# Patient Record
Sex: FEMALE | Race: WHITE | NOT HISPANIC OR LATINO | Employment: OTHER | URBAN - METROPOLITAN AREA
[De-identification: names, ages, dates, MRNs, and addresses within clinical notes are randomized per-mention and may not be internally consistent; named-entity substitution may affect disease eponyms.]

---

## 2018-01-22 ENCOUNTER — PREP FOR SURGERY (OUTPATIENT)
Dept: OTHER | Facility: HOSPITAL | Age: 68
End: 2018-01-22

## 2018-01-22 DIAGNOSIS — Z80.0 FH: COLON CANCER: ICD-10-CM

## 2018-01-22 DIAGNOSIS — D12.6 ADENOMATOUS POLYP OF COLON, UNSPECIFIED PART OF COLON: Primary | ICD-10-CM

## 2018-01-23 PROBLEM — Z80.0 FH: COLON CANCER: Status: ACTIVE | Noted: 2018-01-23

## 2018-01-23 PROBLEM — D12.6 ADENOMATOUS POLYP OF COLON: Status: ACTIVE | Noted: 2018-01-23

## 2018-03-20 ENCOUNTER — HOSPITAL ENCOUNTER (OUTPATIENT)
Facility: HOSPITAL | Age: 68
Setting detail: HOSPITAL OUTPATIENT SURGERY
Discharge: HOME | End: 2018-03-20
Attending: INTERNAL MEDICINE | Admitting: INTERNAL MEDICINE

## 2018-03-20 ENCOUNTER — ANESTHESIA (OUTPATIENT)
Dept: GASTROENTEROLOGY | Facility: HOSPITAL | Age: 68
End: 2018-03-20

## 2018-03-20 ENCOUNTER — ANESTHESIA EVENT (OUTPATIENT)
Dept: GASTROENTEROLOGY | Facility: HOSPITAL | Age: 68
End: 2018-03-20

## 2018-03-20 VITALS
DIASTOLIC BLOOD PRESSURE: 68 MMHG | BODY MASS INDEX: 23.94 KG/M2 | SYSTOLIC BLOOD PRESSURE: 109 MMHG | TEMPERATURE: 97.6 F | HEART RATE: 71 BPM | HEIGHT: 62 IN | WEIGHT: 130.1 LBS | OXYGEN SATURATION: 100 % | RESPIRATION RATE: 16 BRPM

## 2018-03-20 DIAGNOSIS — Z80.0 FH: COLON CANCER: ICD-10-CM

## 2018-03-20 DIAGNOSIS — D12.6 ADENOMATOUS POLYP OF COLON, UNSPECIFIED PART OF COLON: ICD-10-CM

## 2018-03-20 PROCEDURE — 45385 COLONOSCOPY W/LESION REMOVAL: CPT | Performed by: INTERNAL MEDICINE

## 2018-03-20 PROCEDURE — 88305 TISSUE EXAM BY PATHOLOGIST: CPT | Performed by: INTERNAL MEDICINE

## 2018-03-20 PROCEDURE — 45381 COLONOSCOPY SUBMUCOUS NJX: CPT | Performed by: INTERNAL MEDICINE

## 2018-03-20 PROCEDURE — 25010000002 PROPOFOL 10 MG/ML EMULSION: Performed by: ANESTHESIOLOGY

## 2018-03-20 PROCEDURE — S0260 H&P FOR SURGERY: HCPCS | Performed by: INTERNAL MEDICINE

## 2018-03-20 RX ORDER — SODIUM CHLORIDE, SODIUM LACTATE, POTASSIUM CHLORIDE, CALCIUM CHLORIDE 600; 310; 30; 20 MG/100ML; MG/100ML; MG/100ML; MG/100ML
30 INJECTION, SOLUTION INTRAVENOUS CONTINUOUS PRN
Status: DISCONTINUED | OUTPATIENT
Start: 2018-03-20 | End: 2018-03-20 | Stop reason: HOSPADM

## 2018-03-20 RX ORDER — VITAMIN E 268 MG
400 CAPSULE ORAL DAILY
COMMUNITY
End: 2021-10-07

## 2018-03-20 RX ORDER — FLUTICASONE PROPIONATE 50 MCG
2 SPRAY, SUSPENSION (ML) NASAL DAILY
COMMUNITY

## 2018-03-20 RX ORDER — PROPOFOL 10 MG/ML
VIAL (ML) INTRAVENOUS CONTINUOUS PRN
Status: DISCONTINUED | OUTPATIENT
Start: 2018-03-20 | End: 2018-03-20 | Stop reason: SURG

## 2018-03-20 RX ORDER — ASPIRIN 81 MG/1
81 TABLET, CHEWABLE ORAL DAILY
COMMUNITY
End: 2021-10-19 | Stop reason: HOSPADM

## 2018-03-20 RX ORDER — PROPOFOL 10 MG/ML
VIAL (ML) INTRAVENOUS AS NEEDED
Status: DISCONTINUED | OUTPATIENT
Start: 2018-03-20 | End: 2018-03-20 | Stop reason: SURG

## 2018-03-20 RX ORDER — SODIUM CHLORIDE 0.9 % (FLUSH) 0.9 %
1-10 SYRINGE (ML) INJECTION AS NEEDED
Status: DISCONTINUED | OUTPATIENT
Start: 2018-03-20 | End: 2018-03-20 | Stop reason: HOSPADM

## 2018-03-20 RX ORDER — LIDOCAINE HYDROCHLORIDE 20 MG/ML
INJECTION, SOLUTION INFILTRATION; PERINEURAL AS NEEDED
Status: DISCONTINUED | OUTPATIENT
Start: 2018-03-20 | End: 2018-03-20 | Stop reason: SURG

## 2018-03-20 RX ORDER — FEXOFENADINE HCL 180 MG/1
180 TABLET ORAL DAILY
COMMUNITY

## 2018-03-20 RX ADMIN — LIDOCAINE HYDROCHLORIDE 60 MG: 20 INJECTION, SOLUTION INFILTRATION; PERINEURAL at 08:44

## 2018-03-20 RX ADMIN — PROPOFOL 80 MG: 10 INJECTION, EMULSION INTRAVENOUS at 08:44

## 2018-03-20 RX ADMIN — PROPOFOL 20 MG: 10 INJECTION, EMULSION INTRAVENOUS at 08:45

## 2018-03-20 RX ADMIN — PROPOFOL 140 MCG/KG/MIN: 10 INJECTION, EMULSION INTRAVENOUS at 08:44

## 2018-03-20 RX ADMIN — SODIUM CHLORIDE, POTASSIUM CHLORIDE, SODIUM LACTATE AND CALCIUM CHLORIDE: 600; 310; 30; 20 INJECTION, SOLUTION INTRAVENOUS at 08:46

## 2018-03-20 RX ADMIN — PROPOFOL 20 MG: 10 INJECTION, EMULSION INTRAVENOUS at 08:53

## 2018-03-20 NOTE — ANESTHESIA PREPROCEDURE EVALUATION
Anesthesia Evaluation     Patient summary reviewed and Nursing notes reviewed                Airway   Mallampati: II  TM distance: >3 FB  Neck ROM: full  No difficulty expected  Dental      Pulmonary    (+) asthma,   Cardiovascular     (+) valvular problems/murmurs MVP, dysrhythmias Atrial Fib,       Neuro/Psych  GI/Hepatic/Renal/Endo      Musculoskeletal     Abdominal    Substance History      OB/GYN          Other                        Anesthesia Plan    ASA 2     MAC   total IV anesthesia  intravenous induction   Anesthetic plan and risks discussed with patient.

## 2018-03-20 NOTE — ANESTHESIA POSTPROCEDURE EVALUATION
Patient: Reina Zaman    Procedure Summary     Date:  03/20/18 Room / Location:   [facility]    Anesthesia Start:  0836 Anesthesia Stop:  0911    Procedure:  COLONOSCOPY INTO CECUM AND TI WITH HOT SNARE POLYPECTOMIES, SALINE LIFT (N/A ) Diagnosis:       FH: colon cancer      Adenomatous polyp of colon, unspecified part of colon      (FH: colon cancer [Z80.0])      (Adenomatous polyp of colon, unspecified part of colon [D12.6])    Surgeon:  Lidia Laura MD Provider:  Rowan eVnegas MD    Anesthesia Type:  MAC ASA Status:  2          Anesthesia Type: MAC  Last vitals  BP   110/66 (03/20/18 0911)   Temp   36.4 °C (97.6 °F) (03/20/18 0711)   Pulse   79 (03/20/18 0911)   Resp   16 (03/20/18 0911)     SpO2   96 % (03/20/18 0911)     Post Anesthesia Care and Evaluation    Patient location during evaluation: bedside  Patient participation: complete - patient cannot participate  Level of consciousness: awake and alert  Pain management: adequate  Airway patency: patent  Anesthetic complications: No anesthetic complications  PONV Status: controlled  Cardiovascular status: acceptable  Respiratory status: acceptable  Hydration status: acceptable

## 2018-03-20 NOTE — DISCHARGE INSTRUCTIONS
For the next 24 hours patient needs to be with a responsible adult.    For 24 hours DO NOT drive, operate machinery, appliances, drink alcohol, make important decisions or sign legal documents.    Start with a light or bland diet and advance to regular diet as tolerated.    Follow recommendations on procedure report provided by your doctor.    Call Dr Laura for problems [phone]    Problems may include but not limited to: large amounts of bleeding, trouble breathing, repeated vomiting, severe unrelieved pain, fever or chills.

## 2018-03-20 NOTE — H&P
[facility]  Pre Procedure History & Physical    Chief Complaint:   History of adenomatous polyps, FH CRC in multiple second degree relatives (2 1st cousins, MGM)    Subjective     HPI:   67 yo WF for surveillance c/s.  Last c/s 3 years ago with tubular adenome removed by another physician.  FH CRC.  NO active gi issues    Past Medical History:   Past Medical History:   Diagnosis Date   • Asthma    • Atrial fibrillation    • Colon polyp    • Endometriosis    • MVP (mitral valve prolapse)    • Osteoporosis        Past Surgical History:  Past Surgical History:   Procedure Laterality Date   • ABDOMINAL SURGERY     • APPENDECTOMY      removed with colon surgery   • COLON SURGERY  1983    bowel blockage   • COLONOSCOPY  01/2015   • FULGURATION ENDOMETRIOSIS     • HYSTERECTOMY         Family History:  Family History   Problem Relation Age of Onset   • Cancer Maternal Grandmother      colon   • Cancer Other      two cousins colon cancer       Social History:   reports that she has quit smoking. She has never used smokeless tobacco. She reports that she drinks about 4.2 oz of alcohol per week . She reports that she does not use drugs.    Medications:   Prescriptions Prior to Admission   Medication Sig Dispense Refill Last Dose   • aspirin 81 MG chewable tablet Chew 81 mg Daily.   3/13/2018   • B Complex-Biotin-FA (SUPER B-COMPLEX PO) Take 1 dose by mouth Daily.   Past Week at Unknown time   • Bioflavonoid Products (GRETTA C PO) Take 2,000 mg by mouth Daily.   Past Week at Unknown time   • Calcium Carbonate-Vit D-Min (CALCIUM 1200 PO) Take 2 tablets by mouth Daily.   Past Week at Unknown time   • Coenzyme Q10 (COQ10) 400 MG capsule Take 400 mg by mouth Daily.   Past Week at Unknown time   • fexofenadine (ALLEGRA) 180 MG tablet Take 180 mg by mouth Daily.   3/19/2018 at Unknown time   • Flaxseed, Linseed, (FLAXSEED OIL) 1200 MG capsule Take 2,400 mg by mouth Daily.   Past Week at Unknown time   •  "fluticasone (FLONASE) 50 MCG/ACT nasal spray 2 sprays into each nostril Daily.   3/19/2018 at Unknown time   • Misc Natural Products (OSTEO BI-FLEX ADV TRIPLE ST PO) Take 1 dose by mouth Daily.   Past Week at Unknown time   • Multiple Vitamins-Minerals (CENTRUM SILVER 50+WOMEN PO) Take 1 dose by mouth Daily.   Past Week at Unknown time   • Probiotic Product ([employer] PO) Take 1 tablet by mouth Daily.   Past Week at Unknown time   • St Johns Wort 450 MG capsule Take 2 capsules by mouth Daily.   Past Week at Unknown time   • TURMERIC PO Take 2,000 mg by mouth Daily.   Past Week at Unknown time   • vitamin E 400 UNIT capsule Take 400 Units by mouth Daily.   Past Week at Unknown time   • baclofen (LIORESAL) 20 MG tablet    More than a month at Unknown time   • Estradiol (DIVIGEL TD) Place  on the skin.   More than a month at Unknown time   • meloxicam (MOBIC) 15 MG tablet    More than a month at Unknown time   • metoprolol succinate XL (TOPROL-XL) 25 MG 24 hr tablet 12.5 mg.   2/27/2018       Allergies:  Review of patient's allergies indicates no known allergies.    ROS:    Pertinent items are noted in HPI, all other systems reviewed and negative     Objective     Blood pressure 128/78, pulse 71, temperature 97.6 °F (36.4 °C), temperature source Oral, resp. rate 14, height 157.5 cm (62\"), weight 59 kg (130 lb 1.6 oz), SpO2 98 %.    Physical Exam   Constitutional: Pt is oriented to person, place, and time and well-developed, well-nourished, and in no distress.   Mouth/Throat: Oropharynx is clear and moist.   Neck: Normal range of motion.   Cardiovascular: Normal rate, regular rhythm   Pulmonary/Chest: Effort normal    Abdominal: Soft. Nontender  Skin: Skin is warm and dry.   Psychiatric: Mood, memory, affect and judgment normal.     Assessment/Plan     Diagnosis:  History of adenomatous polyps, FH CRC in multiple second degree relatives (2 1st cousins, MGM)    Anticipated Surgical " Procedure:  colonoscopy    The risks, benefits, and alternatives of this procedure have been discussed with the patient or the responsible party- the patient understands and agrees to proceed.

## 2018-03-21 LAB
CYTO UR: NORMAL
LAB AP CASE REPORT: NORMAL
Lab: NORMAL
PATH REPORT.FINAL DX SPEC: NORMAL
PATH REPORT.GROSS SPEC: NORMAL

## 2018-03-26 ENCOUNTER — TELEPHONE (OUTPATIENT)
Dept: GASTROENTEROLOGY | Facility: CLINIC | Age: 68
End: 2018-03-26

## 2018-03-27 NOTE — TELEPHONE ENCOUNTER
Call to pt.  Advise per DR Laura that the polyp(s) bx showed adenomatous change.  This is not cancerous, but is considered potentially precancerous.  F/u c/s in 3 yrs is advised.  Pt verb understanding.    C/s for 3/20/21 is in recall.

## 2019-02-22 ENCOUNTER — TRANSCRIBE ORDERS (OUTPATIENT)
Dept: ADMINISTRATIVE | Facility: HOSPITAL | Age: 69
End: 2019-02-22

## 2019-02-22 DIAGNOSIS — M25.562 LEFT KNEE PAIN, UNSPECIFIED CHRONICITY: Primary | ICD-10-CM

## 2019-02-22 DIAGNOSIS — M25.561 RIGHT KNEE PAIN, UNSPECIFIED CHRONICITY: ICD-10-CM

## 2019-03-10 ENCOUNTER — HOSPITAL ENCOUNTER (OUTPATIENT)
Dept: MRI IMAGING | Facility: HOSPITAL | Age: 69
Discharge: HOME | End: 2019-03-10
Admitting: INTERNAL MEDICINE

## 2019-03-10 ENCOUNTER — HOSPITAL ENCOUNTER (OUTPATIENT)
Dept: MRI IMAGING | Facility: HOSPITAL | Age: 69
Discharge: HOME | End: 2019-03-10

## 2019-03-10 DIAGNOSIS — M25.562 LEFT KNEE PAIN, UNSPECIFIED CHRONICITY: ICD-10-CM

## 2019-03-10 DIAGNOSIS — M25.561 RIGHT KNEE PAIN, UNSPECIFIED CHRONICITY: ICD-10-CM

## 2019-03-10 PROCEDURE — 73721 MRI JNT OF LWR EXTRE W/O DYE: CPT

## 2019-04-25 ENCOUNTER — OFFICE VISIT (OUTPATIENT)
Dept: ORTHOPEDIC SURGERY | Facility: CLINIC | Age: 69
End: 2019-04-25

## 2019-04-25 VITALS — BODY MASS INDEX: 23.85 KG/M2 | HEIGHT: 62 IN | WEIGHT: 129.6 LBS | TEMPERATURE: 97.8 F

## 2019-04-25 DIAGNOSIS — M25.561 BILATERAL CHRONIC KNEE PAIN: Primary | ICD-10-CM

## 2019-04-25 DIAGNOSIS — M25.562 BILATERAL CHRONIC KNEE PAIN: Primary | ICD-10-CM

## 2019-04-25 DIAGNOSIS — G89.29 BILATERAL CHRONIC KNEE PAIN: Primary | ICD-10-CM

## 2019-04-25 PROCEDURE — 99203 OFFICE O/P NEW LOW 30 MIN: CPT | Performed by: NURSE PRACTITIONER

## 2019-04-25 PROCEDURE — 73562 X-RAY EXAM OF KNEE 3: CPT | Performed by: NURSE PRACTITIONER

## 2019-04-25 RX ORDER — CELECOXIB 200 MG/1
200 CAPSULE ORAL DAILY PRN
Refills: 10 | COMMUNITY
Start: 2019-04-19 | End: 2021-10-07

## 2019-04-25 RX ORDER — METOPROLOL SUCCINATE 25 MG/1
12.5 TABLET, EXTENDED RELEASE ORAL DAILY
COMMUNITY
Start: 2018-12-04 | End: 2019-04-25

## 2019-04-25 RX ORDER — PANTOPRAZOLE SODIUM 40 MG/1
40 TABLET, DELAYED RELEASE ORAL DAILY
Refills: 3 | COMMUNITY
Start: 2019-04-19 | End: 2021-04-19 | Stop reason: SDUPTHER

## 2019-04-25 NOTE — PROGRESS NOTES
Patient: Reina Zaman  YOB: 1950 69 y.o. female  Medical Record Number: [id number]    Chief Complaints:   Chief Complaint   Patient presents with   • Right Knee - Establish Care, Pain   • Left Knee - Establish Care, Pain       History of Present Illness:Reina Zaman is a 69 y.o. female who presents as a new patient to this practice as well as myself with complaints of bilateral knee pain.  Patient reports she has had bilateral knee pain off and on for several years denies any injury.  Describes the knee pain is a moderate constant ache with clicking and swelling, worse with walking better with rest    Allergies: No Known Allergies    Medications:   Current Outpatient Medications   Medication Sig Dispense Refill   • aspirin 81 MG chewable tablet Chew 81 mg Daily.     • B Complex-Biotin-FA (SUPER B-COMPLEX PO) Take 1 dose by mouth Daily.     • Calcium Carbonate-Vit D-Min (CALCIUM 1200 PO) Take 2 tablets by mouth Daily.     • celecoxib (CeleBREX) 200 MG capsule Take 200 mg by mouth Daily As Needed.  10   • Coenzyme Q10 (COQ10) 400 MG capsule Take 400 mg by mouth Daily.     • fexofenadine (ALLEGRA) 180 MG tablet Take 180 mg by mouth Daily.     • Flaxseed, Linseed, (FLAXSEED OIL) 1200 MG capsule Take 2,400 mg by mouth Daily.     • fluticasone (FLONASE) 50 MCG/ACT nasal spray 2 sprays into each nostril Daily.     • metoprolol succinate XL (TOPROL-XL) 25 MG 24 hr tablet 12.5 mg.     • Misc Natural Products (OSTEO BI-FLEX ADV TRIPLE ST PO) Take 1 dose by mouth Daily.     • Multiple Vitamins-Minerals (CENTRUM SILVER 50+WOMEN PO) Take 1 dose by mouth Daily.     • pantoprazole (PROTONIX) 40 MG EC tablet Take 40 mg by mouth Daily.  3   • Probiotic Product ([employer] PO) Take 1 tablet by mouth Daily.     • TURMERIC PO Take 2,000 mg by mouth Daily.     • baclofen (LIORESAL) 20 MG tablet      • Bioflavonoid Products (GRETTA C PO) Take 2,000 mg by mouth Daily.     • Estradiol (DIVIGEL TD) Place  on the  "skin.     • meloxicam (MOBIC) 15 MG tablet      • St Johns Wort 450 MG capsule Take 2 capsules by mouth Daily.     • vitamin E 400 UNIT capsule Take 400 Units by mouth Daily.       No current facility-administered medications for this visit.          The following portions of the patient's history were reviewed and updated as appropriate: allergies, current medications, past family history, past medical history, past social history, past surgical history and problem list.    Review of Systems:   A 14 point review of systems was performed. All systems negative except pertinent positives/negative listed in HPI above    Physical Exam:   Vitals:    04/25/19 1034   Temp: 97.8 °F (36.6 °C)   Weight: 58.8 kg (129 lb 9.6 oz)   Height: 157.5 cm (62\")       General: A and O x 3, ASA, NAD    SCLERA:    Normal    DENTITION:   Normal  Skin clear no unusual lesions noted  Bilateral knees patient has no appreciable effusion 120 degrees flexion neutral and extension with significant patellofemoral crepitus noted on the left.  Positive Jose negative Lockman calf is soft and nontender    Radiology:  Xrays 3views (ap,lateral, sunrise) bilateral knees were ordered and reviewed today secondary to pain and show osteoarthritis bilateral knees patellofemoral on the left more lateral compartment on the right knee.  No compared to these available    Assessment/Plan:  Osteoarthritis bilateral knees    We will proceed with bilateral knee cortisone injection.  Prior to injections risks were discussed including pain, infection, elevated blood sugar.  Patient verbalized understanding would like to proceed with injections.  I did give her information on Synvisc injections and she will return the office in 3 months for those if she is still having pain.  She will continue with physical therapy we will see her back as needed  "

## 2019-07-05 ENCOUNTER — TELEPHONE (OUTPATIENT)
Dept: ORTHOPEDIC SURGERY | Facility: CLINIC | Age: 69
End: 2019-07-05

## 2019-07-05 DIAGNOSIS — M17.0 PRIMARY OSTEOARTHRITIS OF BOTH KNEES: Primary | ICD-10-CM

## 2019-07-26 ENCOUNTER — CLINICAL SUPPORT (OUTPATIENT)
Dept: ORTHOPEDIC SURGERY | Facility: CLINIC | Age: 69
End: 2019-07-26

## 2019-07-26 VITALS — WEIGHT: 128 LBS | HEIGHT: 62 IN | BODY MASS INDEX: 23.55 KG/M2

## 2019-07-26 DIAGNOSIS — M25.562 BILATERAL CHRONIC KNEE PAIN: ICD-10-CM

## 2019-07-26 DIAGNOSIS — G89.29 BILATERAL CHRONIC KNEE PAIN: ICD-10-CM

## 2019-07-26 DIAGNOSIS — M25.561 BILATERAL CHRONIC KNEE PAIN: ICD-10-CM

## 2019-07-26 PROCEDURE — 20610 DRAIN/INJ JOINT/BURSA W/O US: CPT | Performed by: NURSE PRACTITIONER

## 2019-07-26 RX ORDER — METHYLPREDNISOLONE ACETATE 80 MG/ML
80 INJECTION, SUSPENSION INTRA-ARTICULAR; INTRALESIONAL; INTRAMUSCULAR; SOFT TISSUE
Status: COMPLETED | OUTPATIENT
Start: 2019-07-26 | End: 2019-07-26

## 2019-07-26 RX ORDER — ZOSTER VACCINE RECOMBINANT, ADJUVANTED 50 MCG/0.5
KIT INTRAMUSCULAR
COMMUNITY
Start: 2019-07-23 | End: 2020-10-29

## 2019-07-26 RX ORDER — ESTRADIOL 0.1 MG/G
0.5 CREAM VAGINAL 2 TIMES WEEKLY
COMMUNITY
Start: 2019-07-18 | End: 2020-07-17

## 2019-07-26 RX ADMIN — METHYLPREDNISOLONE ACETATE 80 MG: 80 INJECTION, SUSPENSION INTRA-ARTICULAR; INTRALESIONAL; INTRAMUSCULAR; SOFT TISSUE at 07:59

## 2019-07-26 RX ADMIN — METHYLPREDNISOLONE ACETATE 80 MG: 80 INJECTION, SUSPENSION INTRA-ARTICULAR; INTRALESIONAL; INTRAMUSCULAR; SOFT TISSUE at 08:00

## 2019-07-26 NOTE — PROGRESS NOTES
7/26/2019    Reina Zaman is here today for worsening knee pain. Pt has undergone injection of the knee in the past with good resolution of symptoms. Pt is requesting a repeat injection.     KNEE Injection Procedure Note:    Large Joint Arthrocentesis: R knee  Date/Time: 7/26/2019 7:59 AM  Consent given by: patient  Site marked: site marked  Timeout: Immediately prior to procedure a time out was called to verify the correct patient, procedure, equipment, support staff and site/side marked as required   Supporting Documentation  Indications: pain and joint swelling   Procedure Details  Location: knee - R knee  Preparation: Patient was prepped and draped in the usual sterile fashion  Needle size: 22 G (21)  Approach: anterolateral  Medications administered: 80 mg methylPREDNISolone acetate 80 MG/ML; 4 mL lidocaine (cardiac)  Patient tolerance: patient tolerated the procedure well with no immediate complications    Large Joint Arthrocentesis: L knee  Date/Time: 7/26/2019 8:00 AM  Consent given by: patient  Site marked: site marked  Timeout: Immediately prior to procedure a time out was called to verify the correct patient, procedure, equipment, support staff and site/side marked as required   Supporting Documentation  Indications: pain and joint swelling   Procedure Details  Location: knee - L knee  Preparation: Patient was prepped and draped in the usual sterile fashion  Needle size: 22 G (21)  Approach: anterolateral  Medications administered: 4 mL lidocaine (cardiac); 80 mg methylPREDNISolone acetate 80 MG/ML  Patient tolerance: patient tolerated the procedure well with no immediate complications      Prior to injection risks were discussed including pain, infection, elevated blood sugar.  Patient verbalized understanding would like to proceed with injections    At the conclusion of the injection I discussed the importance of continued quad strengthening exercises on a daily basis. I will see the patient back if the  symptoms should fail to improve or worsen.    Veronica Rodriguez APRN  7/26/2019

## 2019-08-05 ENCOUNTER — TELEPHONE (OUTPATIENT)
Dept: ORTHOPEDIC SURGERY | Facility: CLINIC | Age: 69
End: 2019-08-05

## 2019-09-04 ENCOUNTER — TELEPHONE (OUTPATIENT)
Dept: ORTHOPEDIC SURGERY | Facility: CLINIC | Age: 69
End: 2019-09-04

## 2019-09-06 ENCOUNTER — OFFICE VISIT (OUTPATIENT)
Dept: ORTHOPEDIC SURGERY | Facility: CLINIC | Age: 69
End: 2019-09-06

## 2019-09-06 VITALS — WEIGHT: 127 LBS | BODY MASS INDEX: 23.98 KG/M2 | HEIGHT: 61 IN | TEMPERATURE: 97.8 F

## 2019-09-06 DIAGNOSIS — M17.0 PRIMARY OSTEOARTHRITIS OF BOTH KNEES: Primary | ICD-10-CM

## 2019-09-06 PROCEDURE — 20610 DRAIN/INJ JOINT/BURSA W/O US: CPT | Performed by: ORTHOPAEDIC SURGERY

## 2019-09-06 RX ADMIN — METHYLPREDNISOLONE ACETATE 80 MG: 80 INJECTION, SUSPENSION INTRA-ARTICULAR; INTRALESIONAL; INTRAMUSCULAR; SOFT TISSUE at 13:19

## 2019-09-06 RX ADMIN — METHYLPREDNISOLONE ACETATE 80 MG: 80 INJECTION, SUSPENSION INTRA-ARTICULAR; INTRALESIONAL; INTRAMUSCULAR; SOFT TISSUE at 13:20

## 2019-09-06 NOTE — PROGRESS NOTES
9/6/2019    Reina Zaman is here today for worsening knee pain. Pt has undergone injection of the knee in the past with good resolution of symptoms. Pt is requesting a repeat injection.     KNEE Injection Procedure Note:    Large Joint Arthrocentesis: R knee  Date/Time: 9/6/2019 1:19 PM  Consent given by: patient  Site marked: site marked  Timeout: Immediately prior to procedure a time out was called to verify the correct patient, procedure, equipment, support staff and site/side marked as required   Supporting Documentation  Indications: pain   Procedure Details  Location: knee - R knee  Needle size: 25 G  Approach: anteromedial  Medications administered: 2 mL lidocaine (cardiac); 80 mg methylPREDNISolone acetate 80 MG/ML  Aspirate amount: 30 mL      Large Joint Arthrocentesis: L knee  Date/Time: 9/6/2019 1:20 PM  Consent given by: patient  Site marked: site marked  Timeout: Immediately prior to procedure a time out was called to verify the correct patient, procedure, equipment, support staff and site/side marked as required   Supporting Documentation  Indications: pain   Procedure Details  Location: knee - L knee  Needle size: 25 G  Approach: anteromedial  Medications administered: 2 mL lidocaine (cardiac); 80 mg methylPREDNISolone acetate 80 MG/ML  Patient tolerance: patient tolerated the procedure well with no immediate complications          At the conclusion of the injection I discussed the importance of continued quad strengthening exercises on a daily basis. I will see the patient back if the symptoms should fail to improve or worsen.    Rowan Mcneil MA  9/6/2019

## 2019-09-10 RX ORDER — METHYLPREDNISOLONE ACETATE 80 MG/ML
80 INJECTION, SUSPENSION INTRA-ARTICULAR; INTRALESIONAL; INTRAMUSCULAR; SOFT TISSUE
Status: COMPLETED | OUTPATIENT
Start: 2019-09-06 | End: 2019-09-06

## 2020-01-16 ENCOUNTER — OFFICE VISIT (OUTPATIENT)
Dept: ORTHOPEDIC SURGERY | Facility: CLINIC | Age: 70
End: 2020-01-16

## 2020-01-16 VITALS — TEMPERATURE: 98.4 F | WEIGHT: 130 LBS | HEIGHT: 62 IN | BODY MASS INDEX: 23.92 KG/M2

## 2020-01-16 DIAGNOSIS — M17.0 BILATERAL PRIMARY OSTEOARTHRITIS OF KNEE: Primary | ICD-10-CM

## 2020-01-16 PROCEDURE — 20610 DRAIN/INJ JOINT/BURSA W/O US: CPT | Performed by: ORTHOPAEDIC SURGERY

## 2020-01-16 PROCEDURE — 99213 OFFICE O/P EST LOW 20 MIN: CPT | Performed by: ORTHOPAEDIC SURGERY

## 2020-01-16 RX ORDER — DOXYCYCLINE 100 MG/1
CAPSULE ORAL
COMMUNITY
Start: 2019-12-11 | End: 2021-10-07

## 2020-01-16 RX ORDER — ALBUTEROL SULFATE 90 UG/1
AEROSOL, METERED RESPIRATORY (INHALATION)
COMMUNITY
Start: 2019-11-30 | End: 2021-10-07

## 2020-01-16 RX ORDER — AZITHROMYCIN 250 MG/1
TABLET, FILM COATED ORAL
COMMUNITY
Start: 2019-11-30 | End: 2020-10-29

## 2020-01-16 RX ORDER — PREDNISONE 10 MG/1
TABLET ORAL
COMMUNITY
Start: 2019-12-11 | End: 2021-10-07

## 2020-01-16 RX ORDER — IPRATROPIUM BROMIDE 42 UG/1
SPRAY, METERED NASAL
COMMUNITY
Start: 2019-12-11 | End: 2021-10-07

## 2020-01-16 RX ADMIN — METHYLPREDNISOLONE ACETATE 80 MG: 80 INJECTION, SUSPENSION INTRA-ARTICULAR; INTRALESIONAL; INTRAMUSCULAR; SOFT TISSUE at 11:40

## 2020-01-16 NOTE — PROGRESS NOTES
Patient: Reina Zaman  YOB: 1950 70 y.o. female  Medical Record Number: [id number]    Chief Complaints:   Chief Complaint   Patient presents with   • Right Knee - Pain, Follow-up   • Left Knee - Pain, Follow-up       History of Present Illness:Reina Zaman is a 70 y.o. female who presents for follow-up of bilateral knee pain she has an aching pain about the medial aspect of both knees which has progressed over the last month.  She describes a moderate to at times severe in nature.    Allergies: No Known Allergies    Medications:   Current Outpatient Medications   Medication Sig Dispense Refill   • aspirin 81 MG chewable tablet Chew 81 mg Daily.     • azithromycin (ZITHROMAX) 250 MG tablet      • B Complex-Biotin-FA (SUPER B-COMPLEX PO) Take 1 dose by mouth Daily.     • baclofen (LIORESAL) 20 MG tablet      • Bioflavonoid Products (GRETTA C PO) Take 2,000 mg by mouth Daily.     • Calcium Carbonate-Vit D-Min (CALCIUM 1200 PO) Take 2 tablets by mouth Daily.     • celecoxib (CeleBREX) 200 MG capsule Take 200 mg by mouth Daily As Needed.  10   • Coenzyme Q10 (COQ10) 400 MG capsule Take 400 mg by mouth Daily.     • doxycycline (MONODOX) 100 MG capsule      • Estradiol (DIVIGEL TD) Place  on the skin.     • estradiol (ESTRACE) 0.1 MG/GM vaginal cream Insert 0.5 g into the vagina 2 (Two) Times a Week.     • fexofenadine (ALLEGRA) 180 MG tablet Take 180 mg by mouth Daily.     • Flaxseed, Linseed, (FLAXSEED OIL) 1200 MG capsule Take 2,400 mg by mouth Daily.     • fluticasone (FLONASE) 50 MCG/ACT nasal spray 2 sprays into each nostril Daily.     • ipratropium (ATROVENT) 0.06 % nasal spray      • meloxicam (MOBIC) 15 MG tablet      • metoprolol succinate XL (TOPROL-XL) 25 MG 24 hr tablet 12.5 mg.     • Misc Natural Products (OSTEO BI-FLEX ADV TRIPLE ST PO) Take 1 dose by mouth Daily.     • Multiple Vitamins-Minerals (CENTRUM SILVER 50+WOMEN PO) Take 1 dose by mouth Daily.     • pantoprazole (PROTONIX) 40 MG EC  "tablet Take 40 mg by mouth Daily.  3   • predniSONE (DELTASONE) 10 MG tablet      • Probiotic Product ([employer] PO) Take 1 tablet by mouth Daily.     • SHINGRIX 50 MCG/0.5ML reconstituted suspension      • St Johns Wort 450 MG capsule Take 2 capsules by mouth Daily.     • TURMERIC PO Take 2,000 mg by mouth Daily.     • VENTOLIN  (90 Base) MCG/ACT inhaler      • vitamin E 400 UNIT capsule Take 400 Units by mouth Daily.       Current Facility-Administered Medications   Medication Dose Route Frequency Provider Last Rate Last Dose   • hylan (SYNVISC ONE) injection 48 mg  48 mg Intra-articular Once Veronica Rodriguez APRN       • hylan (SYNVISC ONE) injection 48 mg  48 mg Intra-articular Once Veronica Rodriguez APRN             The following portions of the patient's history were reviewed and updated as appropriate: allergies, current medications, past family history, past medical history, past social history, past surgical history and problem list.    Review of Systems:   A 14 point review of systems was performed. All systems negative except pertinent positives/negative listed in HPI above    Physical Exam:   Vitals:    01/16/20 1128   Temp: 98.4 °F (36.9 °C)   Weight: 59 kg (130 lb)   Height: 157.5 cm (62\")       General: A and O x 3, ASA, NAD    SCLERA:    Normal    DENTITION:   Normal  Skin clear no unusual lesions noted  Bilateral knees patient has no appreciable effusion 120 degrees flexion neutral and extension with significant patellofemoral crepitus noted on the left.  Positive Jose negative Lockman calf is soft and nontender    Radiology:  Xrays 3views both knees (ap,lateral, sunrise) taken previously demonstrating advanced left knee PF OA      Assessment/Plan:  Bilateral knee OA L >R - discussed PT exercises, activities to avoid, freviewed xrays. Plan is injection both knees, RTO PRN  Large Joint Arthrocentesis: R knee  Date/Time: 1/16/2020 11:40 AM  Consent given by: patient  Site " marked: site marked  Timeout: Immediately prior to procedure a time out was called to verify the correct patient, procedure, equipment, support staff and site/side marked as required   Supporting Documentation  Indications: pain and joint swelling   Procedure Details  Location: knee - R knee  Preparation: Patient was prepped and draped in the usual sterile fashion  Needle gauge: 21.  Approach: anterolateral  Medications administered: 80 mg methylPREDNISolone acetate 80 MG/ML; 2 mL lidocaine (cardiac)  Patient tolerance: patient tolerated the procedure well with no immediate complications    Large Joint Arthrocentesis: L knee  Date/Time: 1/16/2020 11:40 AM  Consent given by: patient  Site marked: site marked  Timeout: Immediately prior to procedure a time out was called to verify the correct patient, procedure, equipment, support staff and site/side marked as required   Supporting Documentation  Indications: pain and joint swelling   Procedure Details  Location: knee - L knee  Preparation: Patient was prepped and draped in the usual sterile fashion  Needle gauge: 21.  Approach: anterolateral  Medications administered: 80 mg methylPREDNISolone acetate 80 MG/ML; 2 mL lidocaine (cardiac)  Patient tolerance: patient tolerated the procedure well with no immediate complications

## 2020-01-17 RX ORDER — METHYLPREDNISOLONE ACETATE 80 MG/ML
80 INJECTION, SUSPENSION INTRA-ARTICULAR; INTRALESIONAL; INTRAMUSCULAR; SOFT TISSUE
Status: COMPLETED | OUTPATIENT
Start: 2020-01-16 | End: 2020-01-16

## 2020-02-28 ENCOUNTER — HOSPITAL ENCOUNTER (OUTPATIENT)
Dept: GENERAL RADIOLOGY | Facility: HOSPITAL | Age: 70
Discharge: HOME | End: 2020-02-28
Admitting: INTERNAL MEDICINE

## 2020-02-28 DIAGNOSIS — R05.9 COUGH: ICD-10-CM

## 2020-02-28 PROCEDURE — 71046 X-RAY EXAM CHEST 2 VIEWS: CPT

## 2020-05-14 ENCOUNTER — TELEPHONE (OUTPATIENT)
Dept: ORTHOPEDIC SURGERY | Facility: CLINIC | Age: 70
End: 2020-05-14

## 2020-05-14 NOTE — TELEPHONE ENCOUNTER
LORA HAD SYNVISC INJECTIONS IN BILAT KNEES ON 1-16, COMING BACK 7-17 FOR THEM AGAIN. WANTS TO KNOW IF SHE CAN COME IN BETWEEN THE TWO AND GET CORTISONE INJ IN BILAT KNEES?   Noted     Aleida Carrasquillo RN, BSN  [facility]

## 2020-05-19 ENCOUNTER — CLINICAL SUPPORT (OUTPATIENT)
Dept: ORTHOPEDIC SURGERY | Facility: CLINIC | Age: 70
End: 2020-05-19

## 2020-05-19 VITALS — WEIGHT: 132.4 LBS | TEMPERATURE: 98.1 F | HEIGHT: 62 IN | BODY MASS INDEX: 24.37 KG/M2

## 2020-05-19 DIAGNOSIS — M17.0 PRIMARY OSTEOARTHRITIS OF BOTH KNEES: Primary | ICD-10-CM

## 2020-05-19 PROCEDURE — 99213 OFFICE O/P EST LOW 20 MIN: CPT | Performed by: NURSE PRACTITIONER

## 2020-05-19 PROCEDURE — 73562 X-RAY EXAM OF KNEE 3: CPT | Performed by: NURSE PRACTITIONER

## 2020-05-19 PROCEDURE — 20610 DRAIN/INJ JOINT/BURSA W/O US: CPT | Performed by: NURSE PRACTITIONER

## 2020-05-19 RX ORDER — METHYLPREDNISOLONE ACETATE 80 MG/ML
80 INJECTION, SUSPENSION INTRA-ARTICULAR; INTRALESIONAL; INTRAMUSCULAR; SOFT TISSUE
Status: COMPLETED | OUTPATIENT
Start: 2020-05-19 | End: 2020-05-19

## 2020-05-19 RX ORDER — LEVOCETIRIZINE DIHYDROCHLORIDE 5 MG/1
5 TABLET, FILM COATED ORAL
COMMUNITY
Start: 2020-03-25 | End: 2021-10-07

## 2020-05-19 RX ADMIN — METHYLPREDNISOLONE ACETATE 80 MG: 80 INJECTION, SUSPENSION INTRA-ARTICULAR; INTRALESIONAL; INTRAMUSCULAR; SOFT TISSUE at 10:46

## 2020-05-19 NOTE — PROGRESS NOTES
Patient: Reina Zaman  YOB: 1950 70 y.o. female  Medical Record Number: [id number]    Chief Complaints:   Chief Complaint   Patient presents with   • Right Knee - Follow-up, Pain   • Left Knee - Follow-up, Pain       History of Present Illness:Reina Zaman is a 70 y.o. female who presents with complaints of bilateral knee pain right greater than left.  She reports she has had increased pain over the last several months.  Denies any recent injury.  Describes the bilateral knee pain as a moderate sometimes severe constant ache worse with walking better with rest    Allergies: No Known Allergies    Medications:   Current Outpatient Medications   Medication Sig Dispense Refill   • aspirin 81 MG chewable tablet Chew 81 mg Daily.     • azithromycin (ZITHROMAX) 250 MG tablet      • B Complex-Biotin-FA (SUPER B-COMPLEX PO) Take 1 dose by mouth Daily.     • baclofen (LIORESAL) 20 MG tablet      • Bioflavonoid Products (GRETTA C PO) Take 2,000 mg by mouth Daily.     • Calcium Carbonate-Vit D-Min (CALCIUM 1200 PO) Take 2 tablets by mouth Daily.     • celecoxib (CeleBREX) 200 MG capsule Take 200 mg by mouth Daily As Needed.  10   • Coenzyme Q10 (COQ10) 400 MG capsule Take 400 mg by mouth Daily.     • doxycycline (MONODOX) 100 MG capsule      • Estradiol (DIVIGEL TD) Place  on the skin.     • estradiol (ESTRACE) 0.1 MG/GM vaginal cream Insert 0.5 g into the vagina 2 (Two) Times a Week.     • Flaxseed, Linseed, (FLAXSEED OIL) 1200 MG capsule Take 2,400 mg by mouth Daily.     • fluticasone (FLONASE) 50 MCG/ACT nasal spray 2 sprays into each nostril Daily.     • ipratropium (ATROVENT) 0.06 % nasal spray      • meloxicam (MOBIC) 15 MG tablet      • metoprolol succinate XL (TOPROL-XL) 25 MG 24 hr tablet 12.5 mg.     • Misc Natural Products (OSTEO BI-FLEX ADV TRIPLE ST PO) Take 1 dose by mouth Daily.     • Multiple Vitamins-Minerals (CENTRUM SILVER 50+WOMEN PO) Take 1 dose by mouth Daily.     • pantoprazole (PROTONIX) 40  "MG EC tablet Take 40 mg by mouth Daily.  3   • predniSONE (DELTASONE) 10 MG tablet      • Probiotic Product ([employer] PO) Take 1 tablet by mouth Daily.     • SHINGRIX 50 MCG/0.5ML reconstituted suspension      • St Johns Wort 450 MG capsule Take 2 capsules by mouth Daily.     • TURMERIC PO Take 2,000 mg by mouth Daily.     • VENTOLIN  (90 Base) MCG/ACT inhaler      • vitamin E 400 UNIT capsule Take 400 Units by mouth Daily.     • fexofenadine (ALLEGRA) 180 MG tablet Take 180 mg by mouth Daily.     • levocetirizine (XYZAL) 5 MG tablet Take 5 mg by mouth every night at bedtime.       Current Facility-Administered Medications   Medication Dose Route Frequency Provider Last Rate Last Dose   • hylan (SYNVISC ONE) injection 48 mg  48 mg Intra-articular Once Veronica Rodriguez APRN       • hylan (SYNVISC ONE) injection 48 mg  48 mg Intra-articular Once Veronica Rodriguez APRN             The following portions of the patient's history were reviewed and updated as appropriate: allergies, current medications, past family history, past medical history, past social history, past surgical history and problem list.    Review of Systems:   A 14 point review of systems was performed. All systems negative except pertinent positives/negative listed in HPI above    Physical Exam:   Vitals:    05/19/20 1002   Temp: 98.1 °F (36.7 °C)   Weight: 60.1 kg (132 lb 6.4 oz)   Height: 157.5 cm (62\")   PainSc:   5       General: A and O x 3, ASA, NAD    SCLERA:    Normal    DENTITION:   Normal  Skin clear no unusual lesions noted  Bilateral knees patient has no appreciable effusion limited range of motion secondary to pain right greater than left with patellofemoral crepitus noted bilaterally.  Positive Jose negative Lockman calf soft and nontender    Radiology:  Xrays 3views (ap,lateral, sunrise) bilateral knees were ordered and reviewed today secondary to pain show significant bone-on-bone end-stage osteoarthritis.  " Compared to views show definite progression in arthritic changes particularly involving the right knee lateral and patellofemoral compartments    Assessment/Plan:  End-stage osteoarthritis bilateral knees    Patient I discussed treatment options.  She would like to proceed with bilateral knee cortisone injections.  Prior to injections risks were discussed including pain infection.  Patient verbalized understanding would like to proceed with injections.  She will continue with regular exercise program we have referred her back to outpatient physical therapy and she will keep her appointment with Dr. Gerard in July to potentially discuss total knee replacement    Large Joint Arthrocentesis: R knee  Date/Time: 5/19/2020 10:46 AM  Consent given by: patient  Site marked: site marked  Timeout: Immediately prior to procedure a time out was called to verify the correct patient, procedure, equipment, support staff and site/side marked as required   Supporting Documentation  Indications: pain and joint swelling   Procedure Details  Location: knee - R knee  Preparation: Patient was prepped and draped in the usual sterile fashion  Needle size: 22 G  Approach: anterolateral  Medications administered: 2 mL lidocaine (cardiac); 80 mg methylPREDNISolone acetate 80 MG/ML  Patient tolerance: patient tolerated the procedure well with no immediate complications    Large Joint Arthrocentesis: L knee  Date/Time: 5/19/2020 10:46 AM  Consent given by: patient  Site marked: site marked  Timeout: Immediately prior to procedure a time out was called to verify the correct patient, procedure, equipment, support staff and site/side marked as required   Supporting Documentation  Indications: pain and joint swelling   Procedure Details  Location: knee - L knee  Preparation: Patient was prepped and draped in the usual sterile fashion  Needle size: 22 G  Approach: anterolateral  Medications administered: 2 mL lidocaine (cardiac); 80 mg  methylPREDNISolone acetate 80 MG/ML  Patient tolerance: patient tolerated the procedure well with no immediate complications

## 2020-07-16 ENCOUNTER — CLINICAL SUPPORT (OUTPATIENT)
Dept: ORTHOPEDIC SURGERY | Facility: CLINIC | Age: 70
End: 2020-07-16

## 2020-07-16 VITALS — TEMPERATURE: 96 F | WEIGHT: 135 LBS | BODY MASS INDEX: 24.84 KG/M2 | HEIGHT: 62 IN

## 2020-07-16 DIAGNOSIS — M17.0 PRIMARY OSTEOARTHRITIS OF BOTH KNEES: Primary | ICD-10-CM

## 2020-07-16 PROCEDURE — 20610 DRAIN/INJ JOINT/BURSA W/O US: CPT | Performed by: ORTHOPAEDIC SURGERY

## 2020-07-16 RX ORDER — LIDOCAINE HYDROCHLORIDE 10 MG/ML
4 INJECTION, SOLUTION EPIDURAL; INFILTRATION; INTRACAUDAL; PERINEURAL
Status: COMPLETED | OUTPATIENT
Start: 2020-07-16 | End: 2020-07-16

## 2020-07-16 RX ADMIN — LIDOCAINE HYDROCHLORIDE 4 ML: 10 INJECTION, SOLUTION EPIDURAL; INFILTRATION; INTRACAUDAL; PERINEURAL at 14:41

## 2020-07-16 RX ADMIN — LIDOCAINE HYDROCHLORIDE 4 ML: 10 INJECTION, SOLUTION EPIDURAL; INFILTRATION; INTRACAUDAL; PERINEURAL at 14:40

## 2020-07-16 NOTE — PROGRESS NOTES
7/16/2020    Reina Zaman is here today for worsening knee pain. Pt has undergone injection of the knee in the past with good resolution of symptoms. Pt is requesting a repeat injection.     KNEE Injection Procedure Note:    Large Joint Arthrocentesis: R knee  Date/Time: 7/16/2020 2:40 PM  Consent given by: patient  Site marked: site marked  Timeout: Immediately prior to procedure a time out was called to verify the correct patient, procedure, equipment, support staff and site/side marked as required   Supporting Documentation  Indications: pain   Procedure Details  Location: knee - R knee  Needle size: 25 G  Approach: anteromedial  Medications administered: 48 mg hylan 48 MG/6ML; 4 mL lidocaine PF 1% 1 %      Large Joint Arthrocentesis: L knee  Date/Time: 7/16/2020 2:41 PM  Consent given by: patient  Site marked: site marked  Timeout: Immediately prior to procedure a time out was called to verify the correct patient, procedure, equipment, support staff and site/side marked as required   Supporting Documentation  Indications: pain   Procedure Details  Location: knee - L knee  Needle size: 25 G  Approach: anteromedial  Medications administered: 48 mg hylan 48 MG/6ML; 4 mL lidocaine PF 1% 1 %        X-rays 3 views both knees AP lateral sunrise taken on 5/19/2020 reviewed consistent with osteoarthritis right greater than left knee primarily lateral compartment.    At the conclusion of the injection I discussed the importance of continued quad strengthening exercises on a daily basis. I will see the patient back if the symptoms should fail to improve or worsen.    Jason Gerard MD  7/16/2020

## 2020-10-27 ENCOUNTER — HOSPITAL ENCOUNTER (OUTPATIENT)
Dept: GENERAL RADIOLOGY | Facility: HOSPITAL | Age: 70
Discharge: HOME | End: 2020-10-27
Admitting: INTERNAL MEDICINE

## 2020-10-27 ENCOUNTER — TRANSCRIBE ORDERS (OUTPATIENT)
Dept: ADMINISTRATIVE | Facility: HOSPITAL | Age: 70
End: 2020-10-27

## 2020-10-27 DIAGNOSIS — R52 PAIN: ICD-10-CM

## 2020-10-27 PROCEDURE — 72110 X-RAY EXAM L-2 SPINE 4/>VWS: CPT

## 2020-10-29 ENCOUNTER — OFFICE VISIT (OUTPATIENT)
Dept: ORTHOPEDIC SURGERY | Facility: CLINIC | Age: 70
End: 2020-10-29

## 2020-10-29 VITALS — TEMPERATURE: 96.8 F | HEIGHT: 62 IN | BODY MASS INDEX: 24.84 KG/M2 | WEIGHT: 135 LBS

## 2020-10-29 DIAGNOSIS — M17.12 PRIMARY OSTEOARTHRITIS OF LEFT KNEE: ICD-10-CM

## 2020-10-29 DIAGNOSIS — M17.11 PRIMARY OSTEOARTHRITIS OF RIGHT KNEE: Primary | ICD-10-CM

## 2020-10-29 PROCEDURE — 20610 DRAIN/INJ JOINT/BURSA W/O US: CPT | Performed by: NURSE PRACTITIONER

## 2020-10-29 PROCEDURE — 99212 OFFICE O/P EST SF 10 MIN: CPT | Performed by: NURSE PRACTITIONER

## 2020-10-29 RX ORDER — METHYLPREDNISOLONE ACETATE 80 MG/ML
80 INJECTION, SUSPENSION INTRA-ARTICULAR; INTRALESIONAL; INTRAMUSCULAR; SOFT TISSUE
Status: COMPLETED | OUTPATIENT
Start: 2020-10-29 | End: 2020-10-29

## 2020-10-29 RX ADMIN — METHYLPREDNISOLONE ACETATE 80 MG: 80 INJECTION, SUSPENSION INTRA-ARTICULAR; INTRALESIONAL; INTRAMUSCULAR; SOFT TISSUE at 17:20

## 2020-10-29 RX ADMIN — METHYLPREDNISOLONE ACETATE 80 MG: 80 INJECTION, SUSPENSION INTRA-ARTICULAR; INTRALESIONAL; INTRAMUSCULAR; SOFT TISSUE at 17:19

## 2020-10-29 NOTE — PROGRESS NOTES
Patient: Reina Zaman  YOB: 1950 70 y.o. female  Medical Record Number: [id number]    Chief Complaints:   Chief Complaint   Patient presents with   • Left Knee - Follow-up, Pain   • Right Knee - Follow-up, Pain       History of Present Illness:Reina Zaman is a 70 y.o. female who presents for follow-up of bilateral knee pain.  Patient reports the right knee is more painful than the left.  Patient states that she has significant pain in both knees when she goes up and down stairs or from a sitting to standing position.  She is unable to walk more than half of a mile at a time.  Reports her pain is a 7 out of 10.  Patient has had physical therapy as well as steroid and gel injections.  Patient reports she is not quite ready for surgery at this time and would like another steroid injection today if possible.  Patient ambulated full weightbearing walks unassisted in clinic today.    Allergies: No Known Allergies    Medications:   Current Outpatient Medications   Medication Sig Dispense Refill   • aspirin 81 MG chewable tablet Chew 81 mg Daily.     • B Complex-Biotin-FA (SUPER B-COMPLEX PO) Take 1 dose by mouth Daily.     • baclofen (LIORESAL) 20 MG tablet      • Bioflavonoid Products (GRETTA C PO) Take 2,000 mg by mouth Daily.     • Calcium Carbonate-Vit D-Min (CALCIUM 1200 PO) Take 2 tablets by mouth Daily.     • celecoxib (CeleBREX) 200 MG capsule Take 200 mg by mouth Daily As Needed.  10   • Coenzyme Q10 (COQ10) 400 MG capsule Take 400 mg by mouth Daily.     • doxycycline (MONODOX) 100 MG capsule      • Estradiol (DIVIGEL TD) Place  on the skin.     • fexofenadine (ALLEGRA) 180 MG tablet Take 180 mg by mouth Daily.     • Flaxseed, Linseed, (FLAXSEED OIL) 1200 MG capsule Take 2,400 mg by mouth Daily.     • fluticasone (FLONASE) 50 MCG/ACT nasal spray 2 sprays into each nostril Daily.     • ipratropium (ATROVENT) 0.06 % nasal spray      • levocetirizine (XYZAL) 5 MG tablet Take 5 mg by mouth every night  "at bedtime.     • meloxicam (MOBIC) 15 MG tablet      • metoprolol succinate XL (TOPROL-XL) 25 MG 24 hr tablet 12.5 mg.     • Misc Natural Products (OSTEO BI-FLEX ADV TRIPLE ST PO) Take 1 dose by mouth Daily.     • Multiple Vitamins-Minerals (CENTRUM SILVER 50+WOMEN PO) Take 1 dose by mouth Daily.     • pantoprazole (PROTONIX) 40 MG EC tablet Take 40 mg by mouth Daily.  3   • predniSONE (DELTASONE) 10 MG tablet      • Probiotic Product ([employer] PO) Take 1 tablet by mouth Daily.     • St Johns Wort 450 MG capsule Take 2 capsules by mouth Daily.     • TURMERIC PO Take 2,000 mg by mouth Daily.     • VENTOLIN  (90 Base) MCG/ACT inhaler      • vitamin E 400 UNIT capsule Take 400 Units by mouth Daily.       Current Facility-Administered Medications   Medication Dose Route Frequency Provider Last Rate Last Dose   • hylan (SYNVISC ONE) injection 48 mg  48 mg Intra-articular Once Veronica Rodriguez APRN       • hylan (SYNVISC ONE) injection 48 mg  48 mg Intra-articular Once Veronica Rodriguez APRN             The following portions of the patient's history were reviewed and updated as appropriate: allergies, current medications, past family history, past medical history, past social history, past surgical history and problem list.    Review of Systems:   A 14 point review of systems was performed. All systems negative except pertinent positives/negative listed in HPI above    Physical Exam:   Vitals:    10/29/20 1555   Temp: 96.8 °F (36 °C)   TempSrc: Temporal   Weight: 61.2 kg (135 lb)   Height: 157.5 cm (62\")   PainSc:   6   PainLoc: Knee       General: A and O x 3, ASA, NAD    SCLERA:    Normal    DENTITION:   Normal  Knee:  bilateral    ALIGNMENT:     Valgus      GAIT:    Antalgic    SKIN:    No abnormality    RANGE OF MOTION:   5  -  110  DEG    STRENGTH:   4  / 5    LIGAMENTS:    No varus / valgus instability.   Negative  Lachman.    MENISCUS:     Negative   Jose       DISTAL PULSES:    " Paplable    DISTAL SENSATION :   Intact    LYMPHATICS:     No   lymphadenopathy    OTHER:          - Positive   effusion      + Crepitance with ROM         Radiology:  Xrays 3views (ap,lateral, sunrise) taken previously demonstratingmoderate joint space narrowing and advanced valgus osteoarthritis with bone on bone articulation, subchondral cysts, and periarticular osteophytes  not available    Assessment/Plan: Bilateral knee osteoarthritis  Conservative versus surgical options were discussed in great detail with the patient.  Patient has had anti-inflammatory medications, formal physical therapy, and multiple joint injections.  Patient reports that she is not quite ready for surgical procedure at this time therefore we will inject both knees with steroids today in clinic.  Patient can follow-up with us on a as needed as-needed basis.  She voices understanding and satisfaction with her plan today.    Large Joint Arthrocentesis: R knee  Date/Time: 10/29/2020 5:19 PM  Consent given by: patient  Site marked: site marked  Timeout: Immediately prior to procedure a time out was called to verify the correct patient, procedure, equipment, support staff and site/side marked as required   Supporting Documentation  Indications: pain and joint swelling   Procedure Details  Location: knee - R knee  Preparation: Patient was prepped and draped in the usual sterile fashion  Needle size: 22 G  Approach: anterolateral  Medications administered: 2 mL lidocaine (cardiac); 80 mg methylPREDNISolone acetate 80 MG/ML  Patient tolerance: patient tolerated the procedure well with no immediate complications    Large Joint Arthrocentesis: L knee  Date/Time: 10/29/2020 5:20 PM  Consent given by: patient  Site marked: site marked  Timeout: Immediately prior to procedure a time out was called to verify the correct patient, procedure, equipment, support staff and site/side marked as required   Supporting Documentation  Indications: pain and joint  swelling   Procedure Details  Location: knee - L knee  Preparation: Patient was prepped and draped in the usual sterile fashion  Needle size: 22 G  Approach: anterolateral  Medications administered: 80 mg methylPREDNISolone acetate 80 MG/ML; 2 mL lidocaine (cardiac)  Patient tolerance: patient tolerated the procedure well with no immediate complications        - This patient was seen in conjunction with Dr. Jason Gerard today, who agree's with the above stated plan and evaluation.

## 2021-02-25 ENCOUNTER — OFFICE VISIT (OUTPATIENT)
Dept: ORTHOPEDIC SURGERY | Facility: CLINIC | Age: 71
End: 2021-02-25

## 2021-02-25 VITALS — BODY MASS INDEX: 23.92 KG/M2 | HEIGHT: 62 IN | WEIGHT: 130 LBS

## 2021-02-25 DIAGNOSIS — M17.0 PRIMARY OSTEOARTHRITIS OF BOTH KNEES: Primary | ICD-10-CM

## 2021-02-25 PROCEDURE — 99212 OFFICE O/P EST SF 10 MIN: CPT | Performed by: ORTHOPAEDIC SURGERY

## 2021-03-15 ENCOUNTER — BULK ORDERING (OUTPATIENT)
Dept: CASE MANAGEMENT | Facility: OTHER | Age: 71
End: 2021-03-15

## 2021-03-15 DIAGNOSIS — Z23 IMMUNIZATION DUE: ICD-10-CM

## 2021-03-31 ENCOUNTER — TELEPHONE (OUTPATIENT)
Dept: GASTROENTEROLOGY | Facility: CLINIC | Age: 71
End: 2021-03-31

## 2021-03-31 NOTE — TELEPHONE ENCOUNTER
Spoke with pt mailed open access colonoscopy health history pt to fill out and return for MD to place orders previous cls done by Dr Laura 3- in [facility] personal hx polyps

## 2021-03-31 NOTE — TELEPHONE ENCOUNTER
----- Message from Murray Pearson sent at 3/24/2021 10:55 AM EDT -----  Regarding: oa scope  Contact: [phone]  Please call pt for oa scope. Thank you

## 2021-04-19 ENCOUNTER — TELEPHONE (OUTPATIENT)
Dept: GASTROENTEROLOGY | Facility: CLINIC | Age: 71
End: 2021-04-19

## 2021-04-19 ENCOUNTER — OFFICE VISIT (OUTPATIENT)
Dept: GASTROENTEROLOGY | Facility: CLINIC | Age: 71
End: 2021-04-19

## 2021-04-19 VITALS — HEIGHT: 62 IN | WEIGHT: 134.8 LBS | BODY MASS INDEX: 24.8 KG/M2 | TEMPERATURE: 96.8 F

## 2021-04-19 DIAGNOSIS — R09.89 GLOBUS SENSATION: ICD-10-CM

## 2021-04-19 DIAGNOSIS — D12.6 ADENOMATOUS POLYP OF COLON, UNSPECIFIED PART OF COLON: ICD-10-CM

## 2021-04-19 DIAGNOSIS — Z80.0 FH: COLON CANCER: ICD-10-CM

## 2021-04-19 DIAGNOSIS — Z79.1 NSAID LONG-TERM USE: ICD-10-CM

## 2021-04-19 DIAGNOSIS — K21.9 GASTROESOPHAGEAL REFLUX DISEASE, UNSPECIFIED WHETHER ESOPHAGITIS PRESENT: ICD-10-CM

## 2021-04-19 DIAGNOSIS — R13.10 DYSPHAGIA, UNSPECIFIED TYPE: Primary | ICD-10-CM

## 2021-04-19 PROBLEM — R09.A2 GLOBUS SENSATION: Status: ACTIVE | Noted: 2021-04-19

## 2021-04-19 PROCEDURE — 99214 OFFICE O/P EST MOD 30 MIN: CPT | Performed by: NURSE PRACTITIONER

## 2021-04-19 RX ORDER — PANTOPRAZOLE SODIUM 40 MG/1
40 TABLET, DELAYED RELEASE ORAL DAILY
Qty: 90 TABLET | Refills: 0 | Status: SHIPPED | OUTPATIENT
Start: 2021-04-19 | End: 2021-07-07

## 2021-04-19 NOTE — PROGRESS NOTES
Chief Complaint   Patient presents with   • Heartburn   • Difficulty Swallowing     hurts to swallow on occ       Reina Zaman is a  71 y.o. female here for a follow up visit for GERD.    HPI  71-year-old female presents today for follow-up visit for GERD.  She is a patient of Dr. Laura.  She was last seen in the office on 4/13/2016.  She is new to me today.  She reports that for the last several months she has had trouble swallowing and she has felt like things are getting stuck in her throat.  She tells me she is having lots of reflux symptoms lately.  She was given a prescription of Protonix 40 mg daily by her PCP and she took it for a couple of days and it really did seem to help then she was scared to take it long-term so she quit taking it.  She was taking Celebrex daily for her arthritis but her provider told her to stop taking it so she quit taking it about 6 weeks ago.  Since then her reflux symptoms along with the Protonix were definitely better.  But nowhere near 100%.  Since coming off the Celebrex now she has terrible joint pain.  She is taking Tylenol arthritis but she admits is not very helpful.  Her last colonoscopy was on 3/2018.  She has never had an EGD.  She does have a family history of colon cancer.  She also has a personal history of adenomatous colon polyps.  She is due for screening colonoscopy this year.  She denies any abdominal pain, nausea and vomiting, diarrhea, constipation, rectal bleeding or melena.  She admits appetite is good and her weight is stable.  Past Medical History:   Diagnosis Date   • Asthma    • Atrial fibrillation (CMS/HCC)    • Colon polyp    • Endometriosis    • GERD (gastroesophageal reflux disease)    • MVP (mitral valve prolapse)    • Osteoporosis    • Squamous cell carcinoma        Past Surgical History:   Procedure Laterality Date   • ABDOMINAL SURGERY     • APPENDECTOMY      removed with colon surgery   • COLON SURGERY  1983    bowel blockage   • COLONOSCOPY   01/2015   • COLONOSCOPY N/A 3/20/2018    Procedure: COLONOSCOPY INTO CECUM AND TI WITH HOT SNARE POLYPECTOMIES, SALINE LIFT;  Surgeon: Lidia Laura MD;  Location: [facility] ENDOSCOPY;  Service: Gastroenterology   • FULGURATION ENDOMETRIOSIS     • HYSTERECTOMY         Scheduled Meds:hylan, 48 mg, Intra-articular, Once  hylan, 48 mg, Intra-articular, Once        Continuous Infusions:     PRN Meds:.    No Known Allergies    Social History     Socioeconomic History   • Marital status:      Spouse name: Not on file   • Number of children: Not on file   • Years of education: Not on file   • Highest education level: Not on file   Tobacco Use   • Smoking status: Former Smoker   • Smokeless tobacco: Never Used   Vaping Use   • Vaping Use: Never used   Substance and Sexual Activity   • Alcohol use: Yes     Alcohol/week: 7.0 standard drinks     Types: 7 Glasses of wine per week   • Drug use: No   • Sexual activity: Defer       Family History   Problem Relation Age of Onset   • Cancer Maternal Grandmother         colon   • Cancer Other         two cousins colon cancer   • Heart disease Mother    • Cancer Mother         brain       Review of Systems   Constitutional: Negative for appetite change, chills, diaphoresis, fatigue, fever and unexpected weight change.   HENT: Positive for trouble swallowing. Negative for nosebleeds, postnasal drip, sore throat and voice change.    Respiratory: Negative for cough, choking, chest tightness, shortness of breath and wheezing.    Cardiovascular: Negative for chest pain, palpitations and leg swelling.   Gastrointestinal: Positive for abdominal distention. Negative for abdominal pain, anal bleeding, blood in stool, constipation, diarrhea, nausea, rectal pain and vomiting.   Endocrine: Negative for polydipsia, polyphagia and polyuria.   Musculoskeletal: Negative for gait problem.   Skin: Negative for rash and wound.   Allergic/Immunologic: Negative for food allergies.   Neurological:  Negative for dizziness, speech difficulty and light-headedness.   Psychiatric/Behavioral: Negative for confusion, self-injury, sleep disturbance and suicidal ideas.       Vitals:    04/19/21 1509   Temp: 96.8 °F (36 °C)       Physical Exam  Constitutional:       General: She is not in acute distress.     Appearance: She is well-developed. She is not ill-appearing.   HENT:      Head: Normocephalic.   Eyes:      Pupils: Pupils are equal, round, and reactive to light.   Cardiovascular:      Rate and Rhythm: Normal rate and regular rhythm.      Heart sounds: Normal heart sounds.   Pulmonary:      Effort: Pulmonary effort is normal.      Breath sounds: Normal breath sounds.   Abdominal:      General: Bowel sounds are normal. There is no distension.      Palpations: Abdomen is soft. There is no mass.      Tenderness: There is no abdominal tenderness. There is no guarding or rebound.      Hernia: No hernia is present.   Musculoskeletal:         General: Normal range of motion.   Skin:     General: Skin is warm and dry.   Neurological:      Mental Status: She is alert and oriented to person, place, and time.   Psychiatric:         Speech: Speech normal.         Behavior: Behavior normal.         Judgment: Judgment normal.         No radiology results for the last 7 days     1. Dysphagia, unspecified type  - Case Request; Standing  - Case Request    2. Globus sensation  - Case Request; Standing  - Case Request    3. Gastroesophageal reflux disease, unspecified whether esophagitis present  - Case Request; Standing  - Case Request    4. NSAID long-term use  - Case Request; Standing  - Case Request    5. Adenomatous polyp of colon, unspecified part of colon  - Case Request; Standing  - Case Request    6. FH: colon cancer  - Case Request; Standing  - Case Request    Given her history and current symptoms recommend EGD with Dr. Laura for further evaluation.  She is also due for screening colonoscopy so I will go ahead and have  her do both scopes at the same time.  Patient is agreeable to the scopes.  For now I want her to restart the Protonix 40 mg once daily.  We had a long discussion about GERD precautions.  Patient needs to avoid all NSAIDs.  Patient to call the office next week with an update.  Patient to follow-up with me after her scopes.  Patient is agreeable to the plan.

## 2021-04-21 ENCOUNTER — TELEPHONE (OUTPATIENT)
Dept: GASTROENTEROLOGY | Facility: CLINIC | Age: 71
End: 2021-04-21

## 2021-04-21 NOTE — TELEPHONE ENCOUNTER
Last scope 03/20/2018 in [facility]-- personal hx of polyps-- family hx of polyps and colon ca--ASA-- medications:    aspirin 81 MG chewable tablet  B Complex-Biotin-FA (SUPER B-COMPLEX PO)  Bioflavonoid Products (GRETTA C PO)  Calcium Carbonate-Vit D-Min (CALCIUM 1200 PO)  celecoxib (CeleBREX) 200 MG capsule  Coenzyme Q10 (COQ10) 400 MG capsule  fexofenadine (ALLEGRA) 180 MG tablet  Flaxseed, Linseed, (FLAXSEED OIL) 1200 MG capsule  fluticasone (FLONASE) 50 MCG/ACT nasal spray  metoprolol succinate XL (TOPROL-XL) 25 MG 24 hr tablet  Misc Natural Products (OSTEO BI-FLEX ADV TRIPLE ST PO)  Multiple Vitamins-Minerals (CENTRUM SILVER 50+WOMEN PO)  pantoprazole (PROTONIX) 40 MG EC tablet  Probiotic Product (FRS PO)  TURMERIC PO  vitamin E 400 UNIT capsule  Omega 3     OA form scanned into media

## 2021-05-25 ENCOUNTER — OFFICE VISIT (OUTPATIENT)
Dept: ORTHOPEDIC SURGERY | Facility: CLINIC | Age: 71
End: 2021-05-25

## 2021-05-25 VITALS — WEIGHT: 133 LBS | HEIGHT: 62 IN | BODY MASS INDEX: 24.48 KG/M2 | TEMPERATURE: 97.8 F

## 2021-05-25 DIAGNOSIS — R52 PAIN: Primary | ICD-10-CM

## 2021-05-25 DIAGNOSIS — M17.0 PRIMARY OSTEOARTHRITIS OF BOTH KNEES: ICD-10-CM

## 2021-05-25 DIAGNOSIS — M17.11 PRIMARY OSTEOARTHRITIS OF RIGHT KNEE: ICD-10-CM

## 2021-05-25 PROCEDURE — 73562 X-RAY EXAM OF KNEE 3: CPT | Performed by: NURSE PRACTITIONER

## 2021-05-25 PROCEDURE — 20610 DRAIN/INJ JOINT/BURSA W/O US: CPT | Performed by: NURSE PRACTITIONER

## 2021-05-25 PROCEDURE — 99214 OFFICE O/P EST MOD 30 MIN: CPT | Performed by: NURSE PRACTITIONER

## 2021-05-25 RX ORDER — CHLORHEXIDINE GLUCONATE 500 MG/1
CLOTH TOPICAL 2 TIMES DAILY
Status: CANCELLED | OUTPATIENT
Start: 2021-05-25

## 2021-05-25 RX ORDER — METHYLPREDNISOLONE ACETATE 80 MG/ML
80 INJECTION, SUSPENSION INTRA-ARTICULAR; INTRALESIONAL; INTRAMUSCULAR; SOFT TISSUE
Status: COMPLETED | OUTPATIENT
Start: 2021-05-25 | End: 2021-05-25

## 2021-05-25 RX ORDER — PREGABALIN 75 MG/1
150 CAPSULE ORAL ONCE
Status: CANCELLED | OUTPATIENT
Start: 2021-09-20 | End: 2021-05-25

## 2021-05-25 RX ORDER — VANCOMYCIN HYDROCHLORIDE 1 G/200ML
15 INJECTION, SOLUTION INTRAVENOUS ONCE
Status: CANCELLED | OUTPATIENT
Start: 2021-09-20 | End: 2021-05-25

## 2021-05-25 RX ORDER — BACLOFEN 10 MG/1
TABLET ORAL
COMMUNITY
Start: 2021-05-17 | End: 2021-10-07

## 2021-05-25 RX ORDER — MELOXICAM 15 MG/1
15 TABLET ORAL ONCE
Status: CANCELLED | OUTPATIENT
Start: 2021-09-20 | End: 2021-05-25

## 2021-05-25 RX ORDER — FEXOFENADINE HYDROCHLORIDE 60 MG/1
1 TABLET, FILM COATED ORAL DAILY
COMMUNITY
End: 2021-10-07

## 2021-05-25 RX ORDER — CEFAZOLIN SODIUM 2 G/100ML
2 INJECTION, SOLUTION INTRAVENOUS ONCE
Status: CANCELLED | OUTPATIENT
Start: 2021-09-20 | End: 2021-05-25

## 2021-05-25 RX ADMIN — METHYLPREDNISOLONE ACETATE 80 MG: 80 INJECTION, SUSPENSION INTRA-ARTICULAR; INTRALESIONAL; INTRAMUSCULAR; SOFT TISSUE at 17:09

## 2021-05-25 NOTE — PROGRESS NOTES
Patient: Reina Zaman  YOB: 1950 71 y.o. female  Medical Record Number: [id number]    Chief Complaints:   Chief Complaint   Patient presents with   • Right Knee - Follow-up, Pain   • Left Knee - Follow-up, Pain       History of Present Illness:Reina Zaman is a 71 y.o. female who presents for follow-up of bilateral knee pain with the right being worse than the left.  This is a chronic ongoing issue.  We have been conservatively treating the patient.  Patient reports that her pain has worsened over the last few months and describes her pain as a constant ache.  Patient states her pain is a 7 out of 10.  Patient reports his pain is affecting her everyday activities and she is unable to do the things she wants to do.  Patient states that she is interested in talking to Dr. Gerard today about proceeding forward with a right total knee replacement.    Allergies: No Known Allergies    Medications:   Current Outpatient Medications   Medication Sig Dispense Refill   • aspirin 81 MG chewable tablet Chew 81 mg Daily.     • B Complex-Biotin-FA (SUPER B-COMPLEX PO) Take 1 dose by mouth Daily.     • baclofen (LIORESAL) 10 MG tablet      • Calcium Carbonate-Vit D-Min (CALCIUM 1200 PO) Take 2 tablets by mouth Daily.     • fexofenadine (ALLEGRA) 60 MG tablet Take 1 tablet by mouth Daily.     • fluticasone (FLONASE) 50 MCG/ACT nasal spray 2 sprays into each nostril Daily.     • metoprolol succinate XL (TOPROL-XL) 25 MG 24 hr tablet 12.5 mg.     • Misc Natural Products (OSTEO BI-FLEX ADV TRIPLE ST PO) Take 1 dose by mouth Daily.     • Multiple Vitamins-Minerals (CENTRUM SILVER 50+WOMEN PO) Take 1 dose by mouth Daily.     • pantoprazole (PROTONIX) 40 MG EC tablet Take 1 tablet by mouth Daily. 90 tablet 0   • baclofen (LIORESAL) 20 MG tablet      • Bioflavonoid Products (GRETTA C PO) Take 2,000 mg by mouth Daily.     • celecoxib (CeleBREX) 200 MG capsule Take 200 mg by mouth Daily As Needed.  10   • Coenzyme Q10 (COQ10)  "400 MG capsule Take 400 mg by mouth Daily.     • doxycycline (MONODOX) 100 MG capsule      • Estradiol (DIVIGEL TD) Place  on the skin.     • fexofenadine (ALLEGRA) 180 MG tablet Take 180 mg by mouth Daily.     • Flaxseed, Linseed, (FLAXSEED OIL) 1200 MG capsule Take 2,400 mg by mouth Daily.     • ipratropium (ATROVENT) 0.06 % nasal spray      • levocetirizine (XYZAL) 5 MG tablet Take 5 mg by mouth every night at bedtime.     • predniSONE (DELTASONE) 10 MG tablet      • Probiotic Product (Imperator PO) Take 1 tablet by mouth Daily.     • St Johns Wort 450 MG capsule Take 2 capsules by mouth Daily.     • TURMERIC PO Take 2,000 mg by mouth Daily.     • VENTOLIN  (90 Base) MCG/ACT inhaler      • vitamin E 400 UNIT capsule Take 400 Units by mouth Daily.       Current Facility-Administered Medications   Medication Dose Route Frequency Provider Last Rate Last Admin   • hylan (SYNVISC ONE) injection 48 mg  48 mg Intra-articular Once Veronica Rodriguez APRN       • hylan (SYNVISC ONE) injection 48 mg  48 mg Intra-articular Once Veronica Rodriguez APRN             The following portions of the patient's history were reviewed and updated as appropriate: allergies, current medications, past family history, past medical history, past social history, past surgical history and problem list.    Review of Systems:   A 14 point review of systems was performed. All systems negative except pertinent positives/negative listed in HPI above    Physical Exam:   Vitals:    05/25/21 1506   Temp: 97.8 °F (36.6 °C)   Weight: 60.3 kg (133 lb)   Height: 157.5 cm (62\")       General: A and O x 3, ASA, NAD    SCLERA:    Normal    DENTITION:   Normal    Knee:  right    ALIGNMENT:     Valgus      GAIT:    Antalgic    SKIN:    No abnormality    RANGE OF MOTION:   05  -  125   DEG    STRENGTH:   4  / 5    LIGAMENTS:    No varus / valgus instability.   Negative  Lachman.    MENISCUS:     Negative   Jose       DISTAL PULSES:    " Paplable    DISTAL SENSATION :   Intact    LYMPHATICS:     No   lymphadenopathy    OTHER:          + Positive   effusion      + Crepitance with ROM       Radiology:  Xrays 3views right knee (ap,lateral, sunrise) were ordered and reviewed for evaluation of knee pain demonstratingadvanced valgus osteoarthritis with bone on bone articulation, subchondral cysts, and periarticular osteophytes with advanced patellofemoral osteoarthritis.  todays xrays were compared to previous xrays and demonstrate progression of the disease process.    Assessment/Plan: Bilateral knee OA    Continuation of conservative management vs. TKA discussed.  The patient wishes to proceed with total knee replacement.  At this point the patient has failed the full compliment of conservative treatment and stating complete understanding of the risks/benefits/ anternatives wishes to proceed with surgical treatment.    Risk and benefits of surgery were reviewed.  Including, but not limited to, blood clots or pulmonary embolism, anesthesia risk, infection, fracture, skin/leg numbness, persistent pain/crepitance/popping/catching, failure of the implant, need for future surgeries, hematoma, possible nerve or blood vessel injury, need for transfusion, and potential risk of stroke,heart attack or death, among others.  The patient understands and wishes to proceed.     It was explained that if tissue has been repaired or reconstructed, there is also an increased chance of failure which may require further management.  Following the completion of the discussion, the patient expressed understanding of this planned course of care, all their questions were answered and consent will be obtained preoperatively.    Operative Plan: [facility] [employer] Total right knee Replacement an overnight staywith outpatient PT.  We will send the patient for a one-time up prehab visit as well, she will also be a candidate for direct outpatient physical therapy after surgery.   Patient is wanting to schedule surgery for September, therefore we will do bilateral intra-articular joint injections today.  We are also going to aspirate the right knee considering it has a joint effusion.    Large Joint Arthrocentesis: R knee  Date/Time: 5/25/2021 5:09 PM  Consent given by: patient  Site marked: site marked  Timeout: Immediately prior to procedure a time out was called to verify the correct patient, procedure, equipment, support staff and site/side marked as required   Supporting Documentation  Indications: pain and joint swelling   Procedure Details  Location: knee - R knee  Preparation: Patient was prepped and draped in the usual sterile fashion  Needle size: 22 G  Approach: anterolateral  Medications administered: 80 mg methylPREDNISolone acetate 80 MG/ML; 2 mL lidocaine (cardiac)  Aspirate amount: 25 mL  Aspirate: yellow  Patient tolerance: patient tolerated the procedure well with no immediate complications    Large Joint Arthrocentesis: L knee  Date/Time: 5/25/2021 5:09 PM  Consent given by: patient  Site marked: site marked  Timeout: Immediately prior to procedure a time out was called to verify the correct patient, procedure, equipment, support staff and site/side marked as required   Supporting Documentation  Indications: pain and joint swelling   Procedure Details  Location: knee - L knee  Preparation: Patient was prepped and draped in the usual sterile fashion  Needle size: 22 G  Approach: anterolateral  Medications administered: 80 mg methylPREDNISolone acetate 80 MG/ML; 2 mL lidocaine (cardiac)  Patient tolerance: patient tolerated the procedure well with no immediate complications            Salas Moore, SHRUTI  05/25/2021    This patient was seen in conjunction today with Dr. Jason Gerard.  Dr. Gerard agrees with the above-stated assessment and plan.

## 2021-05-26 PROBLEM — M17.11 PRIMARY OSTEOARTHRITIS OF RIGHT KNEE: Status: ACTIVE | Noted: 2021-05-26

## 2021-06-25 ENCOUNTER — TRANSCRIBE ORDERS (OUTPATIENT)
Dept: SLEEP MEDICINE | Facility: HOSPITAL | Age: 71
End: 2021-06-25

## 2021-06-25 DIAGNOSIS — Z01.818 OTHER SPECIFIED PRE-OPERATIVE EXAMINATION: Primary | ICD-10-CM

## 2021-06-28 ENCOUNTER — TELEPHONE (OUTPATIENT)
Dept: ORTHOPEDIC SURGERY | Facility: CLINIC | Age: 71
End: 2021-06-28

## 2021-06-28 NOTE — TELEPHONE ENCOUNTER
Caller: Reina Zaman    Relationship: Self    Best call back number:[phone]    What is the best time to reach you: AFTER 9 AM     Who are you requesting to speak with (clinical staff, provider,  specific staff member): PROVIDER     Do you know the name of the person who called: YURI WALKER    What was the call regarding: PATIENT ADVISED SHE IS HAVING BILATERAL KNEE PAIN BUT THE RIGHT KNEE IS WORSE. PATIENT ADVISED SHE WANTED TO DISCUSS OTHER OPTIONS TO ALLEVIATE THE PAIN UNTIL SHE CAN GET ANOTHER INJECTION.    Do you require a callback:YES

## 2021-06-29 NOTE — TELEPHONE ENCOUNTER
I called and spoke with patient regards to her questions and concerns.  I educated the patient that we cannot do a steroid injection for another 2 months.  Patient reports she is having stomach issues and had to take off the Celebrex by her gastroenterologist.  Therefore I recommended her try some over-the-counter Voltaren gel and instructed her to use twice a day.  Should she have any more questions or concerns to give us call back anytime.

## 2021-07-02 ENCOUNTER — TRANSCRIBE ORDERS (OUTPATIENT)
Dept: SLEEP MEDICINE | Facility: HOSPITAL | Age: 71
End: 2021-07-02

## 2021-07-02 DIAGNOSIS — Z01.818 OTHER SPECIFIED PRE-OPERATIVE EXAMINATION: Primary | ICD-10-CM

## 2021-07-07 RX ORDER — PANTOPRAZOLE SODIUM 40 MG/1
TABLET, DELAYED RELEASE ORAL
Qty: 90 TABLET | Refills: 0 | Status: SHIPPED | OUTPATIENT
Start: 2021-07-07 | End: 2021-10-01

## 2021-07-10 ENCOUNTER — LAB (OUTPATIENT)
Dept: LAB | Facility: HOSPITAL | Age: 71
End: 2021-07-10

## 2021-07-10 ENCOUNTER — APPOINTMENT (OUTPATIENT)
Dept: LAB | Facility: HOSPITAL | Age: 71
End: 2021-07-10

## 2021-07-10 DIAGNOSIS — Z01.818 OTHER SPECIFIED PRE-OPERATIVE EXAMINATION: ICD-10-CM

## 2021-07-10 LAB — SARS-COV-2 ORF1AB RESP QL NAA+PROBE: NOT DETECTED

## 2021-07-10 PROCEDURE — C9803 HOPD COVID-19 SPEC COLLECT: HCPCS

## 2021-07-10 PROCEDURE — U0004 COV-19 TEST NON-CDC HGH THRU: HCPCS

## 2021-07-10 PROCEDURE — U0005 INFEC AGEN DETEC AMPLI PROBE: HCPCS

## 2021-07-13 ENCOUNTER — HOSPITAL ENCOUNTER (OUTPATIENT)
Facility: HOSPITAL | Age: 71
Setting detail: HOSPITAL OUTPATIENT SURGERY
Discharge: HOME | End: 2021-07-13
Attending: INTERNAL MEDICINE | Admitting: INTERNAL MEDICINE

## 2021-07-13 ENCOUNTER — ANESTHESIA EVENT (OUTPATIENT)
Dept: GASTROENTEROLOGY | Facility: HOSPITAL | Age: 71
End: 2021-07-13

## 2021-07-13 ENCOUNTER — ANESTHESIA (OUTPATIENT)
Dept: GASTROENTEROLOGY | Facility: HOSPITAL | Age: 71
End: 2021-07-13

## 2021-07-13 VITALS
HEART RATE: 68 BPM | WEIGHT: 129 LBS | OXYGEN SATURATION: 95 % | SYSTOLIC BLOOD PRESSURE: 117 MMHG | RESPIRATION RATE: 16 BRPM | TEMPERATURE: 97.6 F | HEIGHT: 62 IN | BODY MASS INDEX: 23.74 KG/M2 | DIASTOLIC BLOOD PRESSURE: 68 MMHG

## 2021-07-13 DIAGNOSIS — R09.89 GLOBUS SENSATION: ICD-10-CM

## 2021-07-13 DIAGNOSIS — K21.9 GASTROESOPHAGEAL REFLUX DISEASE, UNSPECIFIED WHETHER ESOPHAGITIS PRESENT: ICD-10-CM

## 2021-07-13 DIAGNOSIS — R13.10 DYSPHAGIA, UNSPECIFIED TYPE: ICD-10-CM

## 2021-07-13 DIAGNOSIS — D12.6 ADENOMATOUS POLYP OF COLON, UNSPECIFIED PART OF COLON: ICD-10-CM

## 2021-07-13 DIAGNOSIS — Z80.0 FH: COLON CANCER: ICD-10-CM

## 2021-07-13 DIAGNOSIS — Z79.1 NSAID LONG-TERM USE: ICD-10-CM

## 2021-07-13 PROCEDURE — 25010000002 PROPOFOL 10 MG/ML EMULSION: Performed by: NURSE ANESTHETIST, CERTIFIED REGISTERED

## 2021-07-13 PROCEDURE — 88305 TISSUE EXAM BY PATHOLOGIST: CPT | Performed by: INTERNAL MEDICINE

## 2021-07-13 PROCEDURE — 45385 COLONOSCOPY W/LESION REMOVAL: CPT | Performed by: INTERNAL MEDICINE

## 2021-07-13 PROCEDURE — 43239 EGD BIOPSY SINGLE/MULTIPLE: CPT | Performed by: INTERNAL MEDICINE

## 2021-07-13 PROCEDURE — S0260 H&P FOR SURGERY: HCPCS | Performed by: INTERNAL MEDICINE

## 2021-07-13 PROCEDURE — 43249 ESOPH EGD DILATION <30 MM: CPT | Performed by: INTERNAL MEDICINE

## 2021-07-13 PROCEDURE — C1726 CATH, BAL DIL, NON-VASCULAR: HCPCS | Performed by: INTERNAL MEDICINE

## 2021-07-13 RX ORDER — SODIUM CHLORIDE 0.9 % (FLUSH) 0.9 %
10 SYRINGE (ML) INJECTION AS NEEDED
Status: DISCONTINUED | OUTPATIENT
Start: 2021-07-13 | End: 2021-07-13 | Stop reason: HOSPADM

## 2021-07-13 RX ORDER — LIDOCAINE HYDROCHLORIDE 10 MG/ML
0.5 INJECTION, SOLUTION INFILTRATION; PERINEURAL ONCE AS NEEDED
Status: DISCONTINUED | OUTPATIENT
Start: 2021-07-13 | End: 2021-07-13 | Stop reason: HOSPADM

## 2021-07-13 RX ORDER — SODIUM CHLORIDE, SODIUM LACTATE, POTASSIUM CHLORIDE, CALCIUM CHLORIDE 600; 310; 30; 20 MG/100ML; MG/100ML; MG/100ML; MG/100ML
1000 INJECTION, SOLUTION INTRAVENOUS CONTINUOUS
Status: DISCONTINUED | OUTPATIENT
Start: 2021-07-13 | End: 2021-07-13 | Stop reason: HOSPADM

## 2021-07-13 RX ORDER — PROPOFOL 10 MG/ML
VIAL (ML) INTRAVENOUS CONTINUOUS PRN
Status: DISCONTINUED | OUTPATIENT
Start: 2021-07-13 | End: 2021-07-13 | Stop reason: SURG

## 2021-07-13 RX ORDER — PROPOFOL 10 MG/ML
VIAL (ML) INTRAVENOUS AS NEEDED
Status: DISCONTINUED | OUTPATIENT
Start: 2021-07-13 | End: 2021-07-13 | Stop reason: SURG

## 2021-07-13 RX ORDER — LIDOCAINE HYDROCHLORIDE 20 MG/ML
INJECTION, SOLUTION INFILTRATION; PERINEURAL AS NEEDED
Status: DISCONTINUED | OUTPATIENT
Start: 2021-07-13 | End: 2021-07-13 | Stop reason: SURG

## 2021-07-13 RX ADMIN — LIDOCAINE HYDROCHLORIDE 60 MG: 20 INJECTION, SOLUTION INFILTRATION; PERINEURAL at 08:07

## 2021-07-13 RX ADMIN — PROPOFOL 50 MG: 10 INJECTION, EMULSION INTRAVENOUS at 08:17

## 2021-07-13 RX ADMIN — PROPOFOL 150 MG: 10 INJECTION, EMULSION INTRAVENOUS at 08:07

## 2021-07-13 RX ADMIN — PROPOFOL 140 MCG/KG/MIN: 10 INJECTION, EMULSION INTRAVENOUS at 08:09

## 2021-07-13 RX ADMIN — PROPOFOL 50 MG: 10 INJECTION, EMULSION INTRAVENOUS at 08:13

## 2021-07-13 RX ADMIN — SODIUM CHLORIDE, POTASSIUM CHLORIDE, SODIUM LACTATE AND CALCIUM CHLORIDE 1000 ML: 600; 310; 30; 20 INJECTION, SOLUTION INTRAVENOUS at 07:55

## 2021-07-13 NOTE — DISCHARGE INSTRUCTIONS
For the next 24 hours patient needs to be with a responsible adult.    For 24 hours DO NOT drive, operate machinery, appliances, drink alcohol, make important decisions or sign legal documents.    Start with a light or bland diet if you are feeling sick to your stomach otherwise advance to regular diet as tolerated.    Follow recommendations on procedure report if provided by your doctor.    Call Dr Laura for problems [phone]    Problems may include but not limited to: large amounts of bleeding, trouble breathing, repeated vomiting, severe unrelieved pain, fever or chills.

## 2021-07-13 NOTE — H&P
[facility]  Pre Procedure History & Physical    Chief Complaint:   GERD, dysphagia, personal history of polyps, family history of colon cancer    Subjective     HPI:   71-year-old female with a history of acid reflux and difficulty swallowing.  She reports that both of these symptoms have improved since starting Protonix.  She is a personal history of adenomatous polyps as well as a family history of cancer.  Her last colonoscopy was in 2018.    Past Medical History:   Past Medical History:   Diagnosis Date   • Asthma    • Atrial fibrillation (CMS/HCC)    • Colon polyp    • Endometriosis    • GERD (gastroesophageal reflux disease)    • MVP (mitral valve prolapse)    • Osteoporosis    • Squamous cell carcinoma        Past Surgical History:  Past Surgical History:   Procedure Laterality Date   • ABDOMINAL SURGERY     • APPENDECTOMY      removed with colon surgery   • COLON SURGERY  1983    bowel blockage   • COLONOSCOPY  01/2015   • COLONOSCOPY N/A 3/20/2018    Procedure: COLONOSCOPY INTO CECUM AND TI WITH HOT SNARE POLYPECTOMIES, SALINE LIFT;  Surgeon: Lidia Laura MD;  Location: [facility] ENDOSCOPY;  Service: Gastroenterology   • FULGURATION ENDOMETRIOSIS     • HYSTERECTOMY         Family History:  Family History   Problem Relation Age of Onset   • Cancer Maternal Grandmother         colon   • Cancer Other         two cousins colon cancer   • Heart disease Mother    • Cancer Mother         brain       Social History:   reports that she has quit smoking. She has never used smokeless tobacco. She reports current alcohol use of about 7.0 standard drinks of alcohol per week. She reports that she does not use drugs.    Medications:   Facility-Administered Medications Prior to Admission   Medication Dose Route Frequency Provider Last Rate Last Admin   • hylan (SYNVISC ONE) injection 48 mg  48 mg Intra-articular Once Veronica Rodriguez APRN       • hylan (SYNVISC ONE) injection 48 mg  48 mg  Intra-articular Once Veronica Rodriguez APRN         Medications Prior to Admission   Medication Sig Dispense Refill Last Dose   • fluticasone (FLONASE) 50 MCG/ACT nasal spray 2 sprays into each nostril Daily.   7/12/2021 at Unknown time   • aspirin 81 MG chewable tablet Chew 81 mg Daily.   7/10/2021   • B Complex-Biotin-FA (SUPER B-COMPLEX PO) Take 1 dose by mouth Daily.   7/11/2021   • baclofen (LIORESAL) 10 MG tablet    7/11/2021   • baclofen (LIORESAL) 20 MG tablet       • Bioflavonoid Products (GRETTA C PO) Take 2,000 mg by mouth Daily.   7/11/2021   • Calcium Carbonate-Vit D-Min (CALCIUM 1200 PO) Take 2 tablets by mouth Daily.   7/11/2021   • celecoxib (CeleBREX) 200 MG capsule Take 200 mg by mouth Daily As Needed.  10 More than a month at Unknown time   • Coenzyme Q10 (COQ10) 400 MG capsule Take 400 mg by mouth Daily.   7/11/2021   • doxycycline (MONODOX) 100 MG capsule    More than a month at Unknown time   • Estradiol (DIVIGEL TD) Place  on the skin.   More than a month at Unknown time   • fexofenadine (ALLEGRA) 180 MG tablet Take 180 mg by mouth Daily.      • fexofenadine (ALLEGRA) 60 MG tablet Take 1 tablet by mouth Daily.   7/11/2021   • Flaxseed, Linseed, (FLAXSEED OIL) 1200 MG capsule Take 2,400 mg by mouth Daily.   7/11/2021   • ipratropium (ATROVENT) 0.06 % nasal spray    More than a month at Unknown time   • levocetirizine (XYZAL) 5 MG tablet Take 5 mg by mouth every night at bedtime.      • metoprolol succinate XL (TOPROL-XL) 25 MG 24 hr tablet 12.5 mg.   7/11/2021   • Misc Natural Products (OSTEO BI-FLEX ADV TRIPLE ST PO) Take 1 dose by mouth Daily.   7/11/2021   • Multiple Vitamins-Minerals (CENTRUM SILVER 50+WOMEN PO) Take 1 dose by mouth Daily.   7/11/2021   • pantoprazole (PROTONIX) 40 MG EC tablet TAKE 1 TABLET BY MOUTH EVERY DAY 90 tablet 0 7/11/2021   • predniSONE (DELTASONE) 10 MG tablet    More than a month at Unknown time   • Probiotic Product ([employer] PO) Take 1 tablet by  "mouth Daily.   7/11/2021   • St Johns Wort 450 MG capsule Take 2 capsules by mouth Daily.   7/11/2021   • TURMERIC PO Take 2,000 mg by mouth Daily.   7/11/2021   • VENTOLIN  (90 Base) MCG/ACT inhaler    More than a month at Unknown time   • vitamin E 400 UNIT capsule Take 400 Units by mouth Daily.   7/11/2021       Allergies:  Patient has no known allergies.    ROS:    Pertinent items are noted in HPI, all other systems reviewed and negative     Objective     Blood pressure 143/66, pulse 70, temperature 97.6 °F (36.4 °C), temperature source Oral, resp. rate 16, height 157.5 cm (62\"), weight 58.5 kg (129 lb), SpO2 98 %.    Physical Exam   Constitutional: Pt is oriented to person, place, and time and well-developed, well-nourished, and in no distress.   Mouth/Throat: Oropharynx is clear and moist.   Neck: Normal range of motion.   Cardiovascular: Normal rate, regular rhythm    Pulmonary/Chest: Effort normal    Abdominal: Soft. Nontender  Skin: Skin is warm and dry.   Psychiatric: Mood, memory, affect and judgment normal.     Assessment/Plan     Diagnosis:  GERD, dysphagia, personal history of polyps, family history of colon cancer    Anticipated Surgical Procedure:  egd/colonoscopy    The risks, benefits, and alternatives of this procedure have been discussed with the patient or the responsible party- the patient understands and agrees to proceed.                                                              "

## 2021-07-13 NOTE — ANESTHESIA PREPROCEDURE EVALUATION
Anesthesia Evaluation     Nursing notes reviewed   no history of anesthetic complications:  NPO Solid Status: > 6 hours             Airway   Mallampati: II  TM distance: >3 FB  Neck ROM: full  Dental - normal exam     Pulmonary - normal exam   (+) a smoker Former, asthma,  Cardiovascular - normal exam    Patient on routine beta blocker    (+) valvular problems/murmurs MVP, dysrhythmias Atrial Fib,       Neuro/Psych  GI/Hepatic/Renal/Endo      ROS Comment:   P/H Colon Plolyp    Musculoskeletal     Abdominal    Substance History      OB/GYN          Other                        Anesthesia Plan    ASA 3     MAC       Anesthetic plan, all risks, benefits, and alternatives have been provided, discussed and informed consent has been obtained with: patient.

## 2021-07-13 NOTE — ANESTHESIA POSTPROCEDURE EVALUATION
Patient: Reina Zaman    Procedure Summary     Date: 07/13/21 Room / Location:  [facility]    Anesthesia Start: 0800 Anesthesia Stop:     Procedures:       ESOPHAGOGASTRODUODENOSCOPY WITH BIOPSIES AND BALLOON DILATION 15, 16.5, 18 (N/A Esophagus)      COLONOSCOPY TO CECUM WITH COLD SNARE POLYPECTOMIES (N/A ) Diagnosis:       Dysphagia, unspecified type      Globus sensation      Gastroesophageal reflux disease, unspecified whether esophagitis present      NSAID long-term use      Adenomatous polyp of colon, unspecified part of colon      FH: colon cancer      (Dysphagia, unspecified type [R13.10])      (Globus sensation [R09.89])      (Gastroesophageal reflux disease, unspecified whether esophagitis present [K21.9])      (NSAID long-term use [Z79.1])      (Adenomatous polyp of colon, unspecified part of colon [D12.6])      (FH: colon cancer [Z80.0])    Surgeons: Lidia Laura MD Provider: John Paul Hartmann MD    Anesthesia Type: MAC ASA Status: 3          Anesthesia Type: MAC    Vitals  Vitals Value Taken Time   /55 07/13/21 0833   Temp     Pulse 41 07/13/21 0833   Resp 16 07/13/21 0833   SpO2 98 % 07/13/21 0833           Post Anesthesia Care and Evaluation    Patient location during evaluation: PHASE II  Anesthetic complications: No anesthetic complications    Comments: Pt had episodes of bradycardia in PACU is Alert and asymptomatic   She Will discuss with cardiologist regarding further dosing of BB

## 2021-07-14 LAB
CYTO UR: NORMAL
LAB AP CASE REPORT: NORMAL
PATH REPORT.FINAL DX SPEC: NORMAL
PATH REPORT.GROSS SPEC: NORMAL

## 2021-07-14 NOTE — PROGRESS NOTES
Minimal chronic inflammation seen in the stomach.  Continue current medications    The polyp(s) showed hyperplastic change, which is non-cancerous and not pre-cancerous. Follow-up colonoscopy in 5 years is advised.

## 2021-07-28 ENCOUNTER — TELEPHONE (OUTPATIENT)
Dept: GASTROENTEROLOGY | Facility: CLINIC | Age: 71
End: 2021-07-28

## 2021-07-28 NOTE — TELEPHONE ENCOUNTER
----- Message from Lidia Laura MD sent at 7/14/2021 12:03 PM EDT -----  Minimal chronic inflammation seen in the stomach.  Continue current medications    The polyp(s) showed hyperplastic change, which is non-cancerous and not pre-cancerous. Follow-up colonoscopy in 5 years is advised.

## 2021-07-29 NOTE — TELEPHONE ENCOUNTER
Call to pt.  Advise per Dr Laura note.  Verb understanding.     Asking how long should be on pantoprazole - concerned re: taking long term.  Question to DR Laura.

## 2021-10-01 ENCOUNTER — TRANSCRIBE ORDERS (OUTPATIENT)
Dept: PREADMISSION TESTING | Facility: HOSPITAL | Age: 71
End: 2021-10-01

## 2021-10-01 RX ORDER — PANTOPRAZOLE SODIUM 40 MG/1
TABLET, DELAYED RELEASE ORAL
Qty: 90 TABLET | Refills: 2 | Status: SHIPPED | OUTPATIENT
Start: 2021-10-01 | End: 2022-08-03 | Stop reason: SDUPTHER

## 2021-10-07 ENCOUNTER — TELEPHONE (OUTPATIENT)
Dept: ORTHOPEDIC SURGERY | Facility: CLINIC | Age: 71
End: 2021-10-07

## 2021-10-07 ENCOUNTER — PRE-ADMISSION TESTING (OUTPATIENT)
Dept: PREADMISSION TESTING | Facility: HOSPITAL | Age: 71
End: 2021-10-07

## 2021-10-07 VITALS
TEMPERATURE: 98.4 F | RESPIRATION RATE: 20 BRPM | BODY MASS INDEX: 25.21 KG/M2 | HEIGHT: 62 IN | HEART RATE: 76 BPM | OXYGEN SATURATION: 98 % | WEIGHT: 137 LBS | DIASTOLIC BLOOD PRESSURE: 73 MMHG | SYSTOLIC BLOOD PRESSURE: 143 MMHG

## 2021-10-07 DIAGNOSIS — M17.11 PRIMARY OSTEOARTHRITIS OF RIGHT KNEE: ICD-10-CM

## 2021-10-07 LAB
ANION GAP SERPL CALCULATED.3IONS-SCNC: 11.5 MMOL/L (ref 5–15)
BILIRUB UR QL STRIP: NEGATIVE
BUN SERPL-MCNC: 15 MG/DL (ref 8–23)
BUN/CREAT SERPL: 21.4 (ref 7–25)
CALCIUM SPEC-SCNC: 9.2 MG/DL (ref 8.6–10.5)
CHLORIDE SERPL-SCNC: 100 MMOL/L (ref 98–107)
CLARITY UR: CLEAR
CO2 SERPL-SCNC: 27.5 MMOL/L (ref 22–29)
COLOR UR: YELLOW
CREAT SERPL-MCNC: 0.7 MG/DL (ref 0.57–1)
DEPRECATED RDW RBC AUTO: 44.9 FL (ref 37–54)
ERYTHROCYTE [DISTWIDTH] IN BLOOD BY AUTOMATED COUNT: 12.9 % (ref 12.3–15.4)
GFR SERPL CREATININE-BSD FRML MDRD: 82 ML/MIN/1.73
GLUCOSE SERPL-MCNC: 80 MG/DL (ref 65–99)
GLUCOSE UR STRIP-MCNC: NEGATIVE MG/DL
HCT VFR BLD AUTO: 40.3 % (ref 34–46.6)
HGB BLD-MCNC: 13 G/DL (ref 12–15.9)
HGB UR QL STRIP.AUTO: NEGATIVE
KETONES UR QL STRIP: NEGATIVE
LEUKOCYTE ESTERASE UR QL STRIP.AUTO: NEGATIVE
MCH RBC QN AUTO: 30.8 PG (ref 26.6–33)
MCHC RBC AUTO-ENTMCNC: 32.3 G/DL (ref 31.5–35.7)
MCV RBC AUTO: 95.5 FL (ref 79–97)
NITRITE UR QL STRIP: NEGATIVE
PH UR STRIP.AUTO: 6.5 [PH] (ref 5–8)
PLATELET # BLD AUTO: 232 10*3/MM3 (ref 140–450)
PMV BLD AUTO: 10.1 FL (ref 6–12)
POTASSIUM SERPL-SCNC: 4.3 MMOL/L (ref 3.5–5.2)
PROT UR QL STRIP: NEGATIVE
QT INTERVAL: 414 MS
RBC # BLD AUTO: 4.22 10*6/MM3 (ref 3.77–5.28)
SODIUM SERPL-SCNC: 139 MMOL/L (ref 136–145)
SP GR UR STRIP: <1.005 (ref 1–1.03)
UROBILINOGEN UR QL STRIP: ABNORMAL
WBC # BLD AUTO: 5.76 10*3/MM3 (ref 3.4–10.8)

## 2021-10-07 PROCEDURE — 93010 ELECTROCARDIOGRAM REPORT: CPT | Performed by: INTERNAL MEDICINE

## 2021-10-07 PROCEDURE — 93005 ELECTROCARDIOGRAM TRACING: CPT

## 2021-10-07 PROCEDURE — 36415 COLL VENOUS BLD VENIPUNCTURE: CPT

## 2021-10-07 PROCEDURE — 80048 BASIC METABOLIC PNL TOTAL CA: CPT

## 2021-10-07 PROCEDURE — 81003 URINALYSIS AUTO W/O SCOPE: CPT

## 2021-10-07 PROCEDURE — 85027 COMPLETE CBC AUTOMATED: CPT

## 2021-10-07 RX ORDER — CHLORHEXIDINE GLUCONATE 500 MG/1
CLOTH TOPICAL 2 TIMES DAILY
Status: DISPENSED | OUTPATIENT
Start: 2021-10-07

## 2021-10-07 RX ORDER — CHLORHEXIDINE GLUCONATE 500 MG/1
1 CLOTH TOPICAL
COMMUNITY
End: 2021-10-19 | Stop reason: HOSPADM

## 2021-10-07 RX ORDER — CEPHRADINE 500 MG
200 CAPSULE ORAL DAILY
COMMUNITY
End: 2021-10-19 | Stop reason: HOSPADM

## 2021-10-07 RX ORDER — UBIDECARENONE 200 MG
200 CAPSULE ORAL 2 TIMES DAILY
COMMUNITY
End: 2021-10-19 | Stop reason: HOSPADM

## 2021-10-07 ASSESSMENT — KOOS JR
KOOS JR SCORE: 16
KOOS JR SCORE: 47.487

## 2021-10-07 NOTE — TELEPHONE ENCOUNTER
Preoperative EKG is abnormal. She is followed by Dr. Lopes with [facility] and was seen by his nurse practitioner back in March of this year. Have received a copy of an EKG from their office from March that does look slightly different than the EKG done today. It also could be due to lead placement. Patient is having no cardiac symptoms. I contacted the cardiologist office to see if I can get her in for clearance prior to her surgery and there can have to check with the office to see when they can work her in and then will get back with the patient. All this has been discussed with the patient

## 2021-10-07 NOTE — TELEPHONE ENCOUNTER
----- Message from SHRUTI Brito sent at 10/7/2021  1:23 PM EDT -----  EKG is abnormal, we do not have a previous one to compare to. Can you please send patient's primary care physician to see if she needs cardiac clearance. Thank you

## 2021-10-07 NOTE — TELEPHONE ENCOUNTER
Patient was seen by [facility] nurse practitioner in March of this year and was stable at that time I have received a copy of an EKG that they did in their office for comparison.  If the EKG has changed patient will need to have cardiac clearance

## 2021-10-07 NOTE — DISCHARGE INSTRUCTIONS
Take the following medications the morning of surgery:    pantoprazole    If you are on prescription narcotic pain medication to control your pain you may also take that medication the morning of surgery.    General Instructions:  • Do not eat solid food after midnight the night before surgery.  • You may drink clear liquids day of surgery but must stop at least one hour before your hospital arrival time.  • It is beneficial for you to have a clear drink that contains carbohydrates the day of surgery.  We suggest a 12 to 20 ounce bottle of Gatorade or Powerade for non-diabetic patients or a 12 to 20 ounce bottle of G2 or Powerade Zero for diabetic patients. (Pediatric patients, are not advised to drink a 12 to 20 ounce carbohydrate drink)    Clear liquids are liquids you can see through.  Nothing red in color.     Plain water                               Sports drinks  Sodas                                   Gelatin (Jell-O)  Fruit juices without pulp such as white grape juice and apple juice  Popsicles that contain no fruit or yogurt  Tea or coffee (no cream or milk added)  Gatorade / Powerade  G2 / Powerade Zero    • Infants may have breast milk up to four hours before surgery.  • Infants drinking formula may drink formula up to six hours before surgery.   • Patients who avoid smoking, chewing tobacco and alcohol for 4 weeks prior to surgery have a reduced risk of post-operative complications.  Quit smoking as many days before surgery as you can.  • Do not smoke, use chewing tobacco or drink alcohol the day of surgery.   • If applicable bring your C-PAP/ BI-PAP machine.  • Bring any papers given to you in the doctor’s office.  • Wear clean comfortable clothes.  • Do not wear contact lenses, false eyelashes or make-up.  Bring a case for your glasses.   • Bring crutches or walker if applicable.  • Remove all piercings.  Leave jewelry and any other valuables at home.  • Hair extensions with metal clips must be  removed prior to surgery.  • The Pre-Admission Testing nurse will instruct you to bring medications if unable to obtain an accurate list in Pre-Admission Testing.        If you were given a blood bank ID arm band remember to bring it with you the day of surgery.    Preventing a Surgical Site Infection:  • For 2 to 3 days before surgery, avoid shaving with a razor because the razor can irritate skin and make it easier to develop an infection.    • Any areas of open skin can increase the risk of a post-operative wound infection by allowing bacteria to enter and travel throughout the body.  Notify your surgeon if you have any skin wounds / rashes even if it is not near the expected surgical site.  The area will need assessed to determine if surgery should be delayed until it is healed.  • The night prior to surgery shower using a fresh bar of anti-bacterial soap (such as Dial) and clean washcloth.  Sleep in a clean bed with clean clothing.  Do not allow pets to sleep with you.  • Shower on the morning of surgery using a fresh bar of anti-bacterial soap (such as Dial) and clean washcloth.  Dry with a clean towel and dress in clean clothing.  • Ask your surgeon if you will be receiving antibiotics prior to surgery.  • Make sure you, your family, and all healthcare providers clean their hands with soap and water or an alcohol based hand  before caring for you or your wound.    Day of surgery:10/18/2021   MD office to tell pt when to arrive for surgery  Your arrival time is approximately two hours before your scheduled surgery time.  Upon arrival, a Pre-op nurse and Anesthesiologist will review your health history, obtain vital signs, and answer questions you may have.  The only belongings needed at this time will be a list of your home medications and if applicable your C-PAP/BI-PAP machine.  A Pre-op nurse will start an IV and you may receive medication in preparation for surgery, including something to help you  relax.     Please be aware that surgery does come with discomfort.  We want to make every effort to control your discomfort so please discuss any uncontrolled symptoms with your nurse.   Your doctor will most likely have prescribed pain medications.      If you are going home after surgery you will receive individualized written care instructions before being discharged.  A responsible adult must drive you to and from the hospital on the day of your surgery and stay with you for 24 hours.  Discharge prescriptions can be filled by the hospital pharmacy during regular pharmacy hours.  If you are having surgery late in the day/evening your prescription may be e-prescribed to your pharmacy.  Please verify your pharmacy hours or chose a 24 hour pharmacy to avoid not having access to your prescription because your pharmacy has closed for the day.    If you are staying overnight following surgery, you will be transported to your hospital room following the recovery period.  [facility] has all private rooms.    If you have any questions please call Pre-Admission Testing at [phone].  Deductibles and co-payments are collected on the day of service. Please be prepared to pay the required co-pay, deductible or deposit on the day of service as defined by your plan.    Patient Education for Self-Quarantine Process    • Following your COVID testing, we strongly recommend that you wear a mask when you are with other people and practice social distancing.   • Limit your activities to only required outings.  • Wash your hands with soap and water frequently for at least 20 seconds.   • Avoid touching your eyes, nose and mouth with unwashed hands.  • Do not share anything - utensils, drinking glasses, food from the same bowl.   • Sanitize household surfaces daily. Include all high touch areas (door handles, light switches, phones, countertops, etc.)    Call your surgeon immediately if you experience any of the  following symptoms:  • Sore Throat  • Shortness of Breath or difficulty breathing  • Cough  • Chills  • Body soreness or muscle pain  • Headache  • Fever  • New loss of taste or smell  • Do not arrive for your surgery ill.  Your procedure will need to be rescheduled to another time.  You will need to call your physician before the day of surgery to avoid any unnecessary exposure to hospital staff as well as other patients.    CHLORHEXIDINE CLOTH INSTRUCTIONS  The morning of surgery follow these instructions using the Chlorhexidine cloths you've been given.  These steps reduce bacteria on the body.  Do not use the cloths near your eyes, ears mouth, genitalia or on open wounds.  Throw the cloths away after use but do not try to flush them down a toilet.      • Open and remove one cloth at a time from the package.    • Leave the cloth unfolded and begin the bathing.  • Massage the skin with the cloths using gentle pressure to remove bacteria.  Do not scrub harshly.   • Follow the steps below with one 2% CHG cloth per area (6 total cloths).  • One cloth for neck, shoulders and chest.  • One cloth for both arms, hands, fingers and underarms (do underarms last).  • One cloth for the abdomen followed by groin.  • One cloth for right leg and foot including between the toes.  • One cloth for left leg and foot including between the toes.  • The last cloth is to be used for the back of the neck, back and buttocks.    Allow the CHG to air dry 3 minutes on the skin which will give it time to work and decrease the chance of irritation.  The skin may feel sticky until it is dry.  Do not rinse with water or any other liquid or you will lose the beneficial effects of the CHG.  If mild skin irritation occurs, do rinse the skin to remove the CHG.  Report this to the nurse at time of admission.  Do not apply lotions, creams, ointments, deodorants or perfumes after using the clothes. Dress in clean clothes before coming to the  [facility].    BACTROBAN NASAL OINTMENT  There are many germs normally in your nose. Bactroban is an ointment that will help reduce these germs. Please follow these instructions for Bactroban use:      __1__The day before surgery in the morning  Date_10/17/20/21_______    _2___The day before surgery in the evening              Date__10/17/2021______    _3___The day of surgery in the morning    Date_10/18/2021_______    **Squirt ½ package of Bactroban Ointment onto a cotton applicator and apply to inside of 1st nostril.  Squirt the remaining Bactroban and apply to the inside of the other nostril.    PERIDEX- ORAL:  Use only if your surgeon has ordered  Use the night before and morning of surgery - Swish, gargle, and spit - do not swallow.

## 2021-10-12 ENCOUNTER — TELEPHONE (OUTPATIENT)
Dept: ORTHOPEDIC SURGERY | Facility: CLINIC | Age: 71
End: 2021-10-12

## 2021-10-12 NOTE — TELEPHONE ENCOUNTER
Patient was calling to see if her surgery was still on for next week.  I did speak with Dr. Mcnamara's office.  They were going to check with him and get back with the patient but she had not heard anything back.  I did look through the chart and there is a cardiac clearance scanned in under media.  Have advised the patient that she is okay to proceed

## 2021-10-14 ENCOUNTER — OFFICE VISIT (OUTPATIENT)
Dept: ORTHOPEDIC SURGERY | Facility: CLINIC | Age: 71
End: 2021-10-14

## 2021-10-14 VITALS
DIASTOLIC BLOOD PRESSURE: 80 MMHG | SYSTOLIC BLOOD PRESSURE: 125 MMHG | BODY MASS INDEX: 25.21 KG/M2 | HEIGHT: 62 IN | WEIGHT: 137 LBS | TEMPERATURE: 97.7 F

## 2021-10-14 DIAGNOSIS — M17.11 PRIMARY OSTEOARTHRITIS OF RIGHT KNEE: Primary | ICD-10-CM

## 2021-10-14 PROCEDURE — S0260 H&P FOR SURGERY: HCPCS | Performed by: NURSE PRACTITIONER

## 2021-10-14 PROCEDURE — 77077 JOINT SURVEY SINGLE VIEW: CPT | Performed by: ORTHOPAEDIC SURGERY

## 2021-10-14 NOTE — H&P (VIEW-ONLY)
Patient: Reina Zaman    Date of Admission: 10/18/2021    YOB: 1950    Medical Record Number: [id number]    Admitting Physician: Dr. Jason Gerard    Reason for Admission: End Stage Right Knee OA    History of Present Illness: 71 y.o. female presents with severe end stage knee osteoarthritis which has not been responsive to the full compliment of conservative measures. Despite conservative attempts, there is still severe, constant activity limiting pain. Given the severity of the pain, the patient has elected to proceed with knee replacement.    Allergies: No Known Allergies      Current Medications:  Home Medications:    Current Outpatient Medications on File Prior to Visit   Medication Sig   • Acetaminophen (TYLENOL 8 HOUR ARTHRITIS PAIN PO) Take 1,300 mg by mouth 2 (two) times a day.   • Calcium Carbonate-Vitamin D (CALCIUM 600+D PO) Take 1 tablet by mouth 2 (two) times a day.   • Chlorhexidine Gluconate Cloth 2 % pads Apply 1 application topically. USE AS DIRECTED PREOP   • Coenzyme Q10 200 MG capsule Take 200 mg by mouth 2 (Two) Times a Day.   • fexofenadine (ALLEGRA) 180 MG tablet Take 180 mg by mouth Daily.   • fluticasone (FLONASE) 50 MCG/ACT nasal spray 2 sprays into each nostril Daily.   • metoprolol succinate XL (TOPROL-XL) 25 MG 24 hr tablet Take 12.5 mg by mouth Every Evening.   • mupirocin (BACTROBAN) 2 % nasal ointment 1 application into the nostril(s) as directed by provider. USE AS DIRECTED PREOP   • pantoprazole (PROTONIX) 40 MG EC tablet TAKE 1 TABLET BY MOUTH EVERY DAY   • Alpha-Lipoic Acid 200 MG capsule Take 200 mg by mouth Daily.   • aspirin 81 MG chewable tablet Chew 81 mg Daily. To stop 1 week before surgery   • B Complex-C (SUPER B COMPLEX PO) Take 1 tablet by mouth Daily.   • Bioflavonoid Products (GRETTA C PO) Take 2,000 mg by mouth Daily.   • Misc Natural Products (OSTEO BI-FLEX ADV TRIPLE ST PO) Take 1 dose by mouth Daily.   • Multiple Vitamins-Minerals (CENTRUM SILVER  50+WOMEN PO) Take 1 dose by mouth Daily.   • NON FORMULARY Take 300 mg by mouth Daily.   • Probiotic Product ([employer] PO) Take 1 tablet by mouth Daily.   • TURMERIC PO Take 2,000 mg by mouth Daily.     Current Facility-Administered Medications on File Prior to Visit   Medication   • Chlorhexidine Gluconate Cloth 2 % pads   • hylan (SYNVISC ONE) injection 48 mg   • hylan (SYNVISC ONE) injection 48 mg     PRN Meds:.    PMH:     Past Medical History:   Diagnosis Date   • Arthritis     osteo   • Asthma    • Atrial fibrillation (HCC)    • Colon polyp    • Endometriosis    • GERD (gastroesophageal reflux disease)    • Hyperlipidemia    • MVP (mitral valve prolapse)    • Osteoporosis    • Squamous cell carcinoma     skin squamous       PF/Surg/Soc Hx:     Past Surgical History:   Procedure Laterality Date   • APPENDECTOMY      removed with colon surgery   • COLON SURGERY  1983    bowel blockage   • COLONOSCOPY  01/2015   • COLONOSCOPY N/A 3/20/2018    Procedure: COLONOSCOPY INTO CECUM AND TI WITH HOT SNARE POLYPECTOMIES, SALINE LIFT;  Surgeon: Lidia Laura MD;  Location: [facility] ENDOSCOPY;  Service: Gastroenterology   • COLONOSCOPY N/A 7/13/2021    Procedure: COLONOSCOPY TO CECUM WITH COLD SNARE POLYPECTOMIES;  Surgeon: Lidia Laura MD;  [facility];  Service: Gastroenterology;  Laterality: N/A;  PRE OP - PERS H/O POLYPS  POST OP - COLON POLYPS, HEMORRHOIDS   • ENDOSCOPY N/A 7/13/2021    Procedure: ESOPHAGOGASTRODUODENOSCOPY WITH BIOPSIES AND BALLOON DILATION 15, 16.5, 18;  Surgeon: Lidia Laura MD;  [facility];  Service: Gastroenterology;  Laterality: N/A;  PRE OP - GERD, DYSPHAGIA  POST OP- GASTRITIS, SM HIATAL HERNIA, SCHOTSKIES RING   • FULGURATION ENDOMETRIOSIS     • HYSTERECTOMY     • SKIN CANCER EXCISION          Social History     Occupational History   • Not on file   Tobacco Use   • Smoking status: Former Smoker     Packs/day: 0.25     Years: 6.00      "Pack years: 1.50     Types: Cigarettes     Quit date:      Years since quittin.8   • Smokeless tobacco: Never Used   Vaping Use   • Vaping Use: Never used   Substance and Sexual Activity   • Alcohol use: Yes     Alcohol/week: 7.0 standard drinks     Types: 7 Glasses of wine per week   • Drug use: No     Comment: history college   • Sexual activity: Not on file      Social History     Social History Narrative   • Not on file        Family History   Problem Relation Age of Onset   • Cancer Maternal Grandmother         colon   • Cancer Other         two cousins colon cancer   • Heart disease Mother    • Cancer Mother         brain   • Malig Hyperthermia Neg Hx          Review of Systems:   A 14 point review of systems was performed, pertinent positives discussed above, all other systems are negative    Physical Exam: 71 y.o. female  Vital Signs :   Vitals:    10/14/21 1325   BP: 125/80   BP Location: Left arm   Patient Position: Sitting   Cuff Size: Adult   Temp: 97.7 °F (36.5 °C)   TempSrc: Temporal   Weight: 62.1 kg (137 lb)   Height: 157.5 cm (62\")     General: Alert and Oriented x 3, No acute distress.  Psych: mood and affect appropriate; recent and remote memory intact  Eyes: conjunctiva clear; pupils equally round and reactive, sclera nonicteric  CV: no peripheral edema  Resp: normal respiratory effort  Skin: no rashes or wounds; normal turgor  Musculosketetal; pain and crepitance with knee range of motion  Vascular: palpable distal pulses    Xrays:  -3 views (AP, lateral, and sunrise) were reviewed demonstrating end-stage OA with bone on bone articulation.  -A full length AP xray was ordered and reviewed today for purposes of operative alignment demonstrating end stage arthritic findings. There are no previous full length films for review    Assessment:  End-stage Right knee osteoarthritis. Conservative measures have failed.      Plan:  The plan is to proceed with Right Total Knee Replacement. The " patient voiced understanding of the risks, benefits, and alternative forms of treatment that were discussed with Dr Gerard at the time of scheduling. 23 HH    Veronica Rodriguez, APRN  10/14/2021

## 2021-10-14 NOTE — H&P
Patient: Reina Zaman    Date of Admission: 10/18/2021    YOB: 1950    Medical Record Number: [id number]    Admitting Physician: Dr. Jason Gerard    Reason for Admission: End Stage Right Knee OA    History of Present Illness: 71 y.o. female presents with severe end stage knee osteoarthritis which has not been responsive to the full compliment of conservative measures. Despite conservative attempts, there is still severe, constant activity limiting pain. Given the severity of the pain, the patient has elected to proceed with knee replacement.    Allergies: No Known Allergies      Current Medications:  Home Medications:    Current Outpatient Medications on File Prior to Visit   Medication Sig   • Acetaminophen (TYLENOL 8 HOUR ARTHRITIS PAIN PO) Take 1,300 mg by mouth 2 (two) times a day.   • Calcium Carbonate-Vitamin D (CALCIUM 600+D PO) Take 1 tablet by mouth 2 (two) times a day.   • Chlorhexidine Gluconate Cloth 2 % pads Apply 1 application topically. USE AS DIRECTED PREOP   • Coenzyme Q10 200 MG capsule Take 200 mg by mouth 2 (Two) Times a Day.   • fexofenadine (ALLEGRA) 180 MG tablet Take 180 mg by mouth Daily.   • fluticasone (FLONASE) 50 MCG/ACT nasal spray 2 sprays into each nostril Daily.   • metoprolol succinate XL (TOPROL-XL) 25 MG 24 hr tablet Take 12.5 mg by mouth Every Evening.   • mupirocin (BACTROBAN) 2 % nasal ointment 1 application into the nostril(s) as directed by provider. USE AS DIRECTED PREOP   • pantoprazole (PROTONIX) 40 MG EC tablet TAKE 1 TABLET BY MOUTH EVERY DAY   • Alpha-Lipoic Acid 200 MG capsule Take 200 mg by mouth Daily.   • aspirin 81 MG chewable tablet Chew 81 mg Daily. To stop 1 week before surgery   • B Complex-C (SUPER B COMPLEX PO) Take 1 tablet by mouth Daily.   • Bioflavonoid Products (GRETTA C PO) Take 2,000 mg by mouth Daily.   • Misc Natural Products (OSTEO BI-FLEX ADV TRIPLE ST PO) Take 1 dose by mouth Daily.   • Multiple Vitamins-Minerals (CENTRUM SILVER  50+WOMEN PO) Take 1 dose by mouth Daily.   • NON FORMULARY Take 300 mg by mouth Daily.   • Probiotic Product ([employer] PO) Take 1 tablet by mouth Daily.   • TURMERIC PO Take 2,000 mg by mouth Daily.     Current Facility-Administered Medications on File Prior to Visit   Medication   • Chlorhexidine Gluconate Cloth 2 % pads   • hylan (SYNVISC ONE) injection 48 mg   • hylan (SYNVISC ONE) injection 48 mg     PRN Meds:.    PMH:     Past Medical History:   Diagnosis Date   • Arthritis     osteo   • Asthma    • Atrial fibrillation (HCC)    • Colon polyp    • Endometriosis    • GERD (gastroesophageal reflux disease)    • Hyperlipidemia    • MVP (mitral valve prolapse)    • Osteoporosis    • Squamous cell carcinoma     skin squamous       PF/Surg/Soc Hx:     Past Surgical History:   Procedure Laterality Date   • APPENDECTOMY      removed with colon surgery   • COLON SURGERY  1983    bowel blockage   • COLONOSCOPY  01/2015   • COLONOSCOPY N/A 3/20/2018    Procedure: COLONOSCOPY INTO CECUM AND TI WITH HOT SNARE POLYPECTOMIES, SALINE LIFT;  Surgeon: Lidia Laura MD;  Location: [facility] ENDOSCOPY;  Service: Gastroenterology   • COLONOSCOPY N/A 7/13/2021    Procedure: COLONOSCOPY TO CECUM WITH COLD SNARE POLYPECTOMIES;  Surgeon: Lidia Laura MD;  [facility];  Service: Gastroenterology;  Laterality: N/A;  PRE OP - PERS H/O POLYPS  POST OP - COLON POLYPS, HEMORRHOIDS   • ENDOSCOPY N/A 7/13/2021    Procedure: ESOPHAGOGASTRODUODENOSCOPY WITH BIOPSIES AND BALLOON DILATION 15, 16.5, 18;  Surgeon: Lidia Laura MD;  [facility];  Service: Gastroenterology;  Laterality: N/A;  PRE OP - GERD, DYSPHAGIA  POST OP- GASTRITIS, SM HIATAL HERNIA, SCHOTSKIES RING   • FULGURATION ENDOMETRIOSIS     • HYSTERECTOMY     • SKIN CANCER EXCISION          Social History     Occupational History   • Not on file   Tobacco Use   • Smoking status: Former Smoker     Packs/day: 0.25     Years: 6.00      "Pack years: 1.50     Types: Cigarettes     Quit date:      Years since quittin.8   • Smokeless tobacco: Never Used   Vaping Use   • Vaping Use: Never used   Substance and Sexual Activity   • Alcohol use: Yes     Alcohol/week: 7.0 standard drinks     Types: 7 Glasses of wine per week   • Drug use: No     Comment: history college   • Sexual activity: Not on file      Social History     Social History Narrative   • Not on file        Family History   Problem Relation Age of Onset   • Cancer Maternal Grandmother         colon   • Cancer Other         two cousins colon cancer   • Heart disease Mother    • Cancer Mother         brain   • Malig Hyperthermia Neg Hx          Review of Systems:   A 14 point review of systems was performed, pertinent positives discussed above, all other systems are negative    Physical Exam: 71 y.o. female  Vital Signs :   Vitals:    10/14/21 1325   BP: 125/80   BP Location: Left arm   Patient Position: Sitting   Cuff Size: Adult   Temp: 97.7 °F (36.5 °C)   TempSrc: Temporal   Weight: 62.1 kg (137 lb)   Height: 157.5 cm (62\")     General: Alert and Oriented x 3, No acute distress.  Psych: mood and affect appropriate; recent and remote memory intact  Eyes: conjunctiva clear; pupils equally round and reactive, sclera nonicteric  CV: no peripheral edema  Resp: normal respiratory effort  Skin: no rashes or wounds; normal turgor  Musculosketetal; pain and crepitance with knee range of motion  Vascular: palpable distal pulses    Xrays:  -3 views (AP, lateral, and sunrise) were reviewed demonstrating end-stage OA with bone on bone articulation.  -A full length AP xray was ordered and reviewed today for purposes of operative alignment demonstrating end stage arthritic findings. There are no previous full length films for review    Assessment:  End-stage Right knee osteoarthritis. Conservative measures have failed.      Plan:  The plan is to proceed with Right Total Knee Replacement. The " patient voiced understanding of the risks, benefits, and alternative forms of treatment that were discussed with Dr Gerard at the time of scheduling. 23 HH    Veronica Rodriguez, APRN  10/14/2021

## 2021-10-15 ENCOUNTER — LAB (OUTPATIENT)
Dept: LAB | Facility: HOSPITAL | Age: 71
End: 2021-10-15

## 2021-10-15 LAB — SARS-COV-2 ORF1AB RESP QL NAA+PROBE: NOT DETECTED

## 2021-10-15 PROCEDURE — U0004 COV-19 TEST NON-CDC HGH THRU: HCPCS

## 2021-10-15 PROCEDURE — U0005 INFEC AGEN DETEC AMPLI PROBE: HCPCS

## 2021-10-15 PROCEDURE — C9803 HOPD COVID-19 SPEC COLLECT: HCPCS

## 2021-10-15 NOTE — CASE MANAGEMENT/SOCIAL WORK
Pre Op Ortho Assessment    [facility]     Patient Name: Reina Zaman  MRN: [id number]  Today's Date: 10/15/2021         PRE-OPERATIVE ORTHOPEDIC ASSESSMENT     Row Name 10/15/21 1353       Question    What is your age group? 1    Gender? 1    How far on average can you walk? (a block is 200 meters) 2    Which gait aid do you use? (more often than not) 2    Do you use community supports: (home help, meals on wheels, district nursing) 1    Will you live with someone who can care for you after your operation? 3    Your Score (out of 12) 10       Discharge Disposition/Planning:    Discussed the predicted discharge disposition needed based on RAPT Assessment with the patient Yes    Patient Selected Discharge Disposition: Home    Outpatient Rehabilitation Facility of Choice: other *see comment  [facility]    Post-operative Caregiver Name and Phone Number Spouse  Everardo Stevenson [phone]       Home Equipment    Does patient have a walker for home use? No    Does patient have a 3 in 1 commode for home use? No    Does patient have a shower chair for home use? No    Does patient have an elevated commode seat for home use? No    Does patient have a cane for home use? No    Is there any other medical equipment in the home? No       Pre-Operative Class Attendance    Attended or scheduled to attend the pre-operative class within 1 year of total joint replacement? Yes       Patient Education    Expected time of discharge discussed Yes    Encouraged to attend pre-operative class Yes    Education regarding predicted discharge disposition based on RAPT score Yes  Patient does have 2 sets of stairs in her house.    Patient receptive and voiced understanding of information given Yes                  Shannon Epley, RN

## 2021-10-18 ENCOUNTER — ANESTHESIA EVENT (OUTPATIENT)
Dept: PERIOP | Facility: HOSPITAL | Age: 71
End: 2021-10-18

## 2021-10-18 ENCOUNTER — APPOINTMENT (OUTPATIENT)
Dept: GENERAL RADIOLOGY | Facility: HOSPITAL | Age: 71
End: 2021-10-18

## 2021-10-18 ENCOUNTER — ANESTHESIA (OUTPATIENT)
Dept: PERIOP | Facility: HOSPITAL | Age: 71
End: 2021-10-18

## 2021-10-18 ENCOUNTER — HOSPITAL ENCOUNTER (OUTPATIENT)
Facility: HOSPITAL | Age: 71
Discharge: HOME HEALTH | End: 2021-10-19
Attending: ORTHOPAEDIC SURGERY | Admitting: ORTHOPAEDIC SURGERY

## 2021-10-18 DIAGNOSIS — M17.11 PRIMARY OSTEOARTHRITIS OF RIGHT KNEE: ICD-10-CM

## 2021-10-18 DIAGNOSIS — Z96.651 S/P TKR (TOTAL KNEE REPLACEMENT), RIGHT: Primary | ICD-10-CM

## 2021-10-18 PROCEDURE — 73560 X-RAY EXAM OF KNEE 1 OR 2: CPT

## 2021-10-18 PROCEDURE — C1776 JOINT DEVICE (IMPLANTABLE): HCPCS | Performed by: ORTHOPAEDIC SURGERY

## 2021-10-18 PROCEDURE — 76942 ECHO GUIDE FOR BIOPSY: CPT | Performed by: ORTHOPAEDIC SURGERY

## 2021-10-18 PROCEDURE — C1889 IMPLANT/INSERT DEVICE, NOC: HCPCS | Performed by: ORTHOPAEDIC SURGERY

## 2021-10-18 PROCEDURE — 27447 TOTAL KNEE ARTHROPLASTY: CPT | Performed by: NURSE PRACTITIONER

## 2021-10-18 PROCEDURE — 25010000002 VANCOMYCIN PER 500 MG: Performed by: NURSE PRACTITIONER

## 2021-10-18 PROCEDURE — 25010000002 FENTANYL CITRATE (PF) 50 MCG/ML SOLUTION: Performed by: ANESTHESIOLOGY

## 2021-10-18 PROCEDURE — 25010000002 ONDANSETRON PER 1 MG: Performed by: NURSE ANESTHETIST, CERTIFIED REGISTERED

## 2021-10-18 PROCEDURE — C1713 ANCHOR/SCREW BN/BN,TIS/BN: HCPCS | Performed by: ORTHOPAEDIC SURGERY

## 2021-10-18 PROCEDURE — C9290 INJ, BUPIVACAINE LIPOSOME: HCPCS | Performed by: ORTHOPAEDIC SURGERY

## 2021-10-18 PROCEDURE — 27447 TOTAL KNEE ARTHROPLASTY: CPT | Performed by: ORTHOPAEDIC SURGERY

## 2021-10-18 PROCEDURE — 25010000003 CEFAZOLIN IN DEXTROSE 2-4 GM/100ML-% SOLUTION: Performed by: NURSE PRACTITIONER

## 2021-10-18 PROCEDURE — 97161 PT EVAL LOW COMPLEX 20 MIN: CPT

## 2021-10-18 PROCEDURE — 97110 THERAPEUTIC EXERCISES: CPT

## 2021-10-18 PROCEDURE — A9270 NON-COVERED ITEM OR SERVICE: HCPCS | Performed by: NURSE PRACTITIONER

## 2021-10-18 PROCEDURE — 25010000003 BUPIVACAINE LIPOSOME 1.3 % SUSPENSION 20 ML VIAL: Performed by: ORTHOPAEDIC SURGERY

## 2021-10-18 PROCEDURE — 25010000002 PROPOFOL 10 MG/ML EMULSION: Performed by: NURSE ANESTHETIST, CERTIFIED REGISTERED

## 2021-10-18 PROCEDURE — 25010000002 FENTANYL CITRATE (PF) 50 MCG/ML SOLUTION: Performed by: NURSE ANESTHETIST, CERTIFIED REGISTERED

## 2021-10-18 PROCEDURE — 25010000002 MIDAZOLAM PER 1 MG: Performed by: ANESTHESIOLOGY

## 2021-10-18 PROCEDURE — 25010000002 ROPIVACAINE PER 1 MG: Performed by: ANESTHESIOLOGY

## 2021-10-18 PROCEDURE — 25010000002 MAGNESIUM SULFATE PER 500 MG OF MAGNESIUM: Performed by: NURSE ANESTHETIST, CERTIFIED REGISTERED

## 2021-10-18 PROCEDURE — 63710000001 METOPROLOL SUCCINATE XL 25 MG TABLET SUSTAINED-RELEASE 24 HOUR: Performed by: NURSE PRACTITIONER

## 2021-10-18 PROCEDURE — 25010000002 DEXAMETHASONE PER 1 MG: Performed by: NURSE ANESTHETIST, CERTIFIED REGISTERED

## 2021-10-18 DEVICE — LEGION CRUCIATE RETAINING OXINIUM                                    FEMORAL SIZE 3 RIGHT
Type: IMPLANTABLE DEVICE | Site: KNEE | Status: FUNCTIONAL
Brand: LEGION

## 2021-10-18 DEVICE — GENESIS II NON-POROUS TIBIAL                                    BASEPLATE SIZE 2 RIGHT
Type: IMPLANTABLE DEVICE | Site: KNEE | Status: FUNCTIONAL
Brand: GENESIS II

## 2021-10-18 DEVICE — KNOTLESS TISSUE CONTROL DEVICE, UNDYED UNIDIRECTIONAL (ANTIBACTERIAL) SYNTHETIC ABSORBABLE DEVICE
Type: IMPLANTABLE DEVICE | Site: KNEE | Status: FUNCTIONAL
Brand: STRATAFIX

## 2021-10-18 DEVICE — IMPLANTABLE DEVICE: Type: IMPLANTABLE DEVICE | Site: KNEE | Status: FUNCTIONAL

## 2021-10-18 DEVICE — LEGION CRUCIATE RETAINING HIGH                                    FLEX HIGHLY CROSS LINKED                                    POLYETHYLENE SIZE 1-2 9MM
Type: IMPLANTABLE DEVICE | Site: KNEE | Status: FUNCTIONAL
Brand: LEGION

## 2021-10-18 DEVICE — GENESIS II BICONVEX PATELLA 26MM
Type: IMPLANTABLE DEVICE | Site: KNEE | Status: FUNCTIONAL
Brand: GENESIS II

## 2021-10-18 DEVICE — PALACOS® R IS A FAST-CURING, RADIOPAQUE, POLY(METHYL METHACRYLATE)-BASED BONE CEMENT.PALACOS ® R CONTAINS THE X-RAY CONTRAST MEDIUM ZIRCONIUM DIOXIDE. TO IMPROVE VISIBILITY IN THE SURGICAL FIELD PALACOS ® R HAS BEEN COLOURED WITH CHLOROPHYLL (E141). THE BONE CEMENT IS PREPARED DIRECTLY BEFORE USE BY MIXING A POLYMER POWDER COMPONENT WITH A LIQUID MONOMER COMPONENT. A DUCTILE DOUGH FORMS WHICH CURES WITHIN A FEW MINUTES.
Type: IMPLANTABLE DEVICE | Site: KNEE | Status: FUNCTIONAL
Brand: PALACOS®

## 2021-10-18 DEVICE — VIOLET ANTIBACTERIAL POLYDIOXANONE, KNOTLESS TISSUE CONTROL DEVICE
Type: IMPLANTABLE DEVICE | Site: KNEE | Status: FUNCTIONAL
Brand: STRATAFIX

## 2021-10-18 RX ORDER — NALOXONE HCL 0.4 MG/ML
0.2 VIAL (ML) INJECTION AS NEEDED
Status: DISCONTINUED | OUTPATIENT
Start: 2021-10-18 | End: 2021-10-18 | Stop reason: HOSPADM

## 2021-10-18 RX ORDER — SODIUM CHLORIDE, SODIUM LACTATE, POTASSIUM CHLORIDE, CALCIUM CHLORIDE 600; 310; 30; 20 MG/100ML; MG/100ML; MG/100ML; MG/100ML
9 INJECTION, SOLUTION INTRAVENOUS CONTINUOUS
Status: DISCONTINUED | OUTPATIENT
Start: 2021-10-18 | End: 2021-10-18 | Stop reason: HOSPADM

## 2021-10-18 RX ORDER — OXYCODONE AND ACETAMINOPHEN 10; 325 MG/1; MG/1
1 TABLET ORAL EVERY 4 HOURS PRN
Status: DISCONTINUED | OUTPATIENT
Start: 2021-10-18 | End: 2021-10-18 | Stop reason: HOSPADM

## 2021-10-18 RX ORDER — HYDROCODONE BITARTRATE AND ACETAMINOPHEN 7.5; 325 MG/1; MG/1
1 TABLET ORAL ONCE AS NEEDED
Status: DISCONTINUED | OUTPATIENT
Start: 2021-10-18 | End: 2021-10-18 | Stop reason: HOSPADM

## 2021-10-18 RX ORDER — DEXMEDETOMIDINE HYDROCHLORIDE 100 UG/ML
INJECTION, SOLUTION INTRAVENOUS AS NEEDED
Status: DISCONTINUED | OUTPATIENT
Start: 2021-10-18 | End: 2021-10-18 | Stop reason: SURG

## 2021-10-18 RX ORDER — CEFAZOLIN SODIUM 2 G/100ML
2 INJECTION, SOLUTION INTRAVENOUS ONCE
Status: COMPLETED | OUTPATIENT
Start: 2021-10-18 | End: 2021-10-18

## 2021-10-18 RX ORDER — PROPOFOL 10 MG/ML
VIAL (ML) INTRAVENOUS AS NEEDED
Status: DISCONTINUED | OUTPATIENT
Start: 2021-10-18 | End: 2021-10-18 | Stop reason: SURG

## 2021-10-18 RX ORDER — MIDAZOLAM HYDROCHLORIDE 1 MG/ML
0.5 INJECTION INTRAMUSCULAR; INTRAVENOUS
Status: DISCONTINUED | OUTPATIENT
Start: 2021-10-18 | End: 2021-10-18 | Stop reason: HOSPADM

## 2021-10-18 RX ORDER — DROPERIDOL 2.5 MG/ML
0.62 INJECTION, SOLUTION INTRAMUSCULAR; INTRAVENOUS ONCE AS NEEDED
Status: DISCONTINUED | OUTPATIENT
Start: 2021-10-18 | End: 2021-10-18 | Stop reason: HOSPADM

## 2021-10-18 RX ORDER — MELOXICAM 7.5 MG/1
7.5 TABLET ORAL DAILY
Qty: 14 TABLET | Refills: 0 | Status: SHIPPED | OUTPATIENT
Start: 2021-10-18 | End: 2021-11-01

## 2021-10-18 RX ORDER — ROPIVACAINE HYDROCHLORIDE 5 MG/ML
INJECTION, SOLUTION EPIDURAL; INFILTRATION; PERINEURAL
Status: COMPLETED | OUTPATIENT
Start: 2021-10-18 | End: 2021-10-18

## 2021-10-18 RX ORDER — POLYETHYLENE GLYCOL 3350 17 G/17G
17 POWDER, FOR SOLUTION ORAL 2 TIMES DAILY
Qty: 238 G | Refills: 0 | Status: SHIPPED | OUTPATIENT
Start: 2021-10-18 | End: 2021-10-25

## 2021-10-18 RX ORDER — FLUMAZENIL 0.1 MG/ML
0.2 INJECTION INTRAVENOUS AS NEEDED
Status: DISCONTINUED | OUTPATIENT
Start: 2021-10-18 | End: 2021-10-18 | Stop reason: HOSPADM

## 2021-10-18 RX ORDER — FAMOTIDINE 10 MG/ML
20 INJECTION, SOLUTION INTRAVENOUS ONCE
Status: COMPLETED | OUTPATIENT
Start: 2021-10-18 | End: 2021-10-18

## 2021-10-18 RX ORDER — FENTANYL CITRATE 50 UG/ML
50 INJECTION, SOLUTION INTRAMUSCULAR; INTRAVENOUS
Status: DISCONTINUED | OUTPATIENT
Start: 2021-10-18 | End: 2021-10-18 | Stop reason: HOSPADM

## 2021-10-18 RX ORDER — LIDOCAINE HYDROCHLORIDE 20 MG/ML
INJECTION, SOLUTION INFILTRATION; PERINEURAL AS NEEDED
Status: DISCONTINUED | OUTPATIENT
Start: 2021-10-18 | End: 2021-10-18 | Stop reason: SURG

## 2021-10-18 RX ORDER — LIDOCAINE HYDROCHLORIDE 10 MG/ML
0.5 INJECTION, SOLUTION EPIDURAL; INFILTRATION; INTRACAUDAL; PERINEURAL ONCE AS NEEDED
Status: DISCONTINUED | OUTPATIENT
Start: 2021-10-18 | End: 2021-10-18 | Stop reason: HOSPADM

## 2021-10-18 RX ORDER — GLYCOPYRROLATE 0.2 MG/ML
INJECTION INTRAMUSCULAR; INTRAVENOUS AS NEEDED
Status: DISCONTINUED | OUTPATIENT
Start: 2021-10-18 | End: 2021-10-18 | Stop reason: SURG

## 2021-10-18 RX ORDER — HYDROCODONE BITARTRATE AND ACETAMINOPHEN 7.5; 325 MG/1; MG/1
TABLET ORAL
Qty: 60 TABLET | Refills: 0 | Status: SHIPPED | OUTPATIENT
Start: 2021-10-18

## 2021-10-18 RX ORDER — METOPROLOL SUCCINATE 25 MG/1
12.5 TABLET, EXTENDED RELEASE ORAL EVERY EVENING
Status: DISCONTINUED | OUTPATIENT
Start: 2021-10-18 | End: 2021-10-19 | Stop reason: HOSPADM

## 2021-10-18 RX ORDER — FENTANYL CITRATE 50 UG/ML
INJECTION, SOLUTION INTRAMUSCULAR; INTRAVENOUS
Status: COMPLETED | OUTPATIENT
Start: 2021-10-18 | End: 2021-10-18

## 2021-10-18 RX ORDER — HYDRALAZINE HYDROCHLORIDE 20 MG/ML
5 INJECTION INTRAMUSCULAR; INTRAVENOUS
Status: DISCONTINUED | OUTPATIENT
Start: 2021-10-18 | End: 2021-10-18 | Stop reason: HOSPADM

## 2021-10-18 RX ORDER — FENTANYL CITRATE 50 UG/ML
INJECTION, SOLUTION INTRAMUSCULAR; INTRAVENOUS AS NEEDED
Status: DISCONTINUED | OUTPATIENT
Start: 2021-10-18 | End: 2021-10-18 | Stop reason: SURG

## 2021-10-18 RX ORDER — PROMETHAZINE HYDROCHLORIDE 12.5 MG/1
12.5 TABLET ORAL EVERY 4 HOURS PRN
Status: DISCONTINUED | OUTPATIENT
Start: 2021-10-18 | End: 2021-10-19 | Stop reason: HOSPADM

## 2021-10-18 RX ORDER — ONDANSETRON 2 MG/ML
INJECTION INTRAMUSCULAR; INTRAVENOUS AS NEEDED
Status: DISCONTINUED | OUTPATIENT
Start: 2021-10-18 | End: 2021-10-18 | Stop reason: SURG

## 2021-10-18 RX ORDER — ACETAMINOPHEN 325 MG/1
650 TABLET ORAL EVERY 6 HOURS PRN
Status: DISCONTINUED | OUTPATIENT
Start: 2021-10-18 | End: 2021-10-19 | Stop reason: HOSPADM

## 2021-10-18 RX ORDER — MAGNESIUM SULFATE HEPTAHYDRATE 500 MG/ML
INJECTION, SOLUTION INTRAMUSCULAR; INTRAVENOUS AS NEEDED
Status: DISCONTINUED | OUTPATIENT
Start: 2021-10-18 | End: 2021-10-18 | Stop reason: SURG

## 2021-10-18 RX ORDER — LABETALOL HYDROCHLORIDE 5 MG/ML
5 INJECTION, SOLUTION INTRAVENOUS
Status: DISCONTINUED | OUTPATIENT
Start: 2021-10-18 | End: 2021-10-18 | Stop reason: HOSPADM

## 2021-10-18 RX ORDER — ONDANSETRON 2 MG/ML
4 INJECTION INTRAMUSCULAR; INTRAVENOUS ONCE AS NEEDED
Status: DISCONTINUED | OUTPATIENT
Start: 2021-10-18 | End: 2021-10-19 | Stop reason: HOSPADM

## 2021-10-18 RX ORDER — DIPHENHYDRAMINE HYDROCHLORIDE 50 MG/ML
12.5 INJECTION INTRAMUSCULAR; INTRAVENOUS
Status: DISCONTINUED | OUTPATIENT
Start: 2021-10-18 | End: 2021-10-18 | Stop reason: HOSPADM

## 2021-10-18 RX ORDER — SODIUM CHLORIDE 0.9 % (FLUSH) 0.9 %
3-10 SYRINGE (ML) INJECTION AS NEEDED
Status: DISCONTINUED | OUTPATIENT
Start: 2021-10-18 | End: 2021-10-18 | Stop reason: HOSPADM

## 2021-10-18 RX ORDER — PANTOPRAZOLE SODIUM 40 MG/1
40 TABLET, DELAYED RELEASE ORAL DAILY
Qty: 14 TABLET | Refills: 0 | Status: SHIPPED | OUTPATIENT
Start: 2021-10-18 | End: 2021-11-01

## 2021-10-18 RX ORDER — VANCOMYCIN HYDROCHLORIDE 1 G/200ML
15 INJECTION, SOLUTION INTRAVENOUS ONCE
Status: COMPLETED | OUTPATIENT
Start: 2021-10-18 | End: 2021-10-18

## 2021-10-18 RX ORDER — ONDANSETRON 4 MG/1
4 TABLET, FILM COATED ORAL EVERY 6 HOURS PRN
Status: DISCONTINUED | OUTPATIENT
Start: 2021-10-18 | End: 2021-10-19 | Stop reason: HOSPADM

## 2021-10-18 RX ORDER — DIPHENHYDRAMINE HCL 25 MG
25 CAPSULE ORAL
Status: DISCONTINUED | OUTPATIENT
Start: 2021-10-18 | End: 2021-10-18 | Stop reason: HOSPADM

## 2021-10-18 RX ORDER — ASPIRIN 81 MG/1
TABLET ORAL
Qty: 60 TABLET | Refills: 0 | Status: SHIPPED | OUTPATIENT
Start: 2021-10-19

## 2021-10-18 RX ORDER — IBUPROFEN 600 MG/1
600 TABLET ORAL ONCE AS NEEDED
Status: DISCONTINUED | OUTPATIENT
Start: 2021-10-18 | End: 2021-10-18 | Stop reason: HOSPADM

## 2021-10-18 RX ORDER — IPRATROPIUM BROMIDE AND ALBUTEROL SULFATE 2.5; .5 MG/3ML; MG/3ML
3 SOLUTION RESPIRATORY (INHALATION) ONCE AS NEEDED
Status: DISCONTINUED | OUTPATIENT
Start: 2021-10-18 | End: 2021-10-18 | Stop reason: HOSPADM

## 2021-10-18 RX ORDER — ASPIRIN 81 MG/1
81 TABLET ORAL 2 TIMES DAILY
Status: DISCONTINUED | OUTPATIENT
Start: 2021-10-19 | End: 2021-10-19 | Stop reason: HOSPADM

## 2021-10-18 RX ORDER — EPHEDRINE SULFATE 50 MG/ML
5 INJECTION, SOLUTION INTRAVENOUS ONCE AS NEEDED
Status: DISCONTINUED | OUTPATIENT
Start: 2021-10-18 | End: 2021-10-18 | Stop reason: HOSPADM

## 2021-10-18 RX ORDER — HYDROCODONE BITARTRATE AND ACETAMINOPHEN 7.5; 325 MG/1; MG/1
1 TABLET ORAL EVERY 4 HOURS PRN
Status: DISCONTINUED | OUTPATIENT
Start: 2021-10-18 | End: 2021-10-19 | Stop reason: HOSPADM

## 2021-10-18 RX ORDER — HYDROMORPHONE HYDROCHLORIDE 1 MG/ML
0.5 INJECTION, SOLUTION INTRAMUSCULAR; INTRAVENOUS; SUBCUTANEOUS
Status: DISCONTINUED | OUTPATIENT
Start: 2021-10-18 | End: 2021-10-18 | Stop reason: HOSPADM

## 2021-10-18 RX ORDER — PANTOPRAZOLE SODIUM 40 MG/1
40 TABLET, DELAYED RELEASE ORAL DAILY
Status: DISCONTINUED | OUTPATIENT
Start: 2021-10-19 | End: 2021-10-19 | Stop reason: HOSPADM

## 2021-10-18 RX ORDER — SODIUM CHLORIDE, SODIUM LACTATE, POTASSIUM CHLORIDE, CALCIUM CHLORIDE 600; 310; 30; 20 MG/100ML; MG/100ML; MG/100ML; MG/100ML
100 INJECTION, SOLUTION INTRAVENOUS CONTINUOUS
Status: DISCONTINUED | OUTPATIENT
Start: 2021-10-18 | End: 2021-10-19 | Stop reason: HOSPADM

## 2021-10-18 RX ORDER — ONDANSETRON 4 MG/1
4 TABLET, FILM COATED ORAL EVERY 8 HOURS PRN
Qty: 10 TABLET | Refills: 0 | Status: SHIPPED | OUTPATIENT
Start: 2021-10-18

## 2021-10-18 RX ORDER — PREGABALIN 75 MG/1
150 CAPSULE ORAL ONCE
Status: COMPLETED | OUTPATIENT
Start: 2021-10-18 | End: 2021-10-18

## 2021-10-18 RX ORDER — MIDAZOLAM HYDROCHLORIDE 1 MG/ML
INJECTION INTRAMUSCULAR; INTRAVENOUS
Status: COMPLETED | OUTPATIENT
Start: 2021-10-18 | End: 2021-10-18

## 2021-10-18 RX ORDER — TRANEXAMIC ACID 100 MG/ML
INJECTION, SOLUTION INTRAVENOUS AS NEEDED
Status: DISCONTINUED | OUTPATIENT
Start: 2021-10-18 | End: 2021-10-18 | Stop reason: SURG

## 2021-10-18 RX ORDER — ONDANSETRON 2 MG/ML
4 INJECTION INTRAMUSCULAR; INTRAVENOUS ONCE AS NEEDED
Status: DISCONTINUED | OUTPATIENT
Start: 2021-10-18 | End: 2021-10-18 | Stop reason: HOSPADM

## 2021-10-18 RX ORDER — MAGNESIUM HYDROXIDE 1200 MG/15ML
LIQUID ORAL AS NEEDED
Status: DISCONTINUED | OUTPATIENT
Start: 2021-10-18 | End: 2021-10-18 | Stop reason: HOSPADM

## 2021-10-18 RX ORDER — DEXAMETHASONE SODIUM PHOSPHATE 10 MG/ML
INJECTION INTRAMUSCULAR; INTRAVENOUS AS NEEDED
Status: DISCONTINUED | OUTPATIENT
Start: 2021-10-18 | End: 2021-10-18 | Stop reason: SURG

## 2021-10-18 RX ORDER — MELOXICAM 15 MG/1
15 TABLET ORAL ONCE
Status: COMPLETED | OUTPATIENT
Start: 2021-10-18 | End: 2021-10-18

## 2021-10-18 RX ORDER — SODIUM CHLORIDE 0.9 % (FLUSH) 0.9 %
3 SYRINGE (ML) INJECTION EVERY 12 HOURS SCHEDULED
Status: DISCONTINUED | OUTPATIENT
Start: 2021-10-18 | End: 2021-10-18 | Stop reason: HOSPADM

## 2021-10-18 RX ADMIN — METOPROLOL SUCCINATE 12.5 MG: 25 TABLET, EXTENDED RELEASE ORAL at 17:37

## 2021-10-18 RX ADMIN — FENTANYL CITRATE 25 MCG: 0.05 INJECTION, SOLUTION INTRAMUSCULAR; INTRAVENOUS at 07:03

## 2021-10-18 RX ADMIN — TRANEXAMIC ACID 1000 MG: 1 INJECTION, SOLUTION INTRAVENOUS at 07:55

## 2021-10-18 RX ADMIN — DEXAMETHASONE SODIUM PHOSPHATE 8 MG: 10 INJECTION INTRAMUSCULAR; INTRAVENOUS at 07:11

## 2021-10-18 RX ADMIN — DEXMEDETOMIDINE 20 MCG: 100 INJECTION, SOLUTION, CONCENTRATE INTRAVENOUS at 07:11

## 2021-10-18 RX ADMIN — GLYCOPYRROLATE 0.2 MG: 0.2 INJECTION INTRAMUSCULAR; INTRAVENOUS at 07:11

## 2021-10-18 RX ADMIN — FAMOTIDINE 20 MG: 10 INJECTION INTRAVENOUS at 06:38

## 2021-10-18 RX ADMIN — VANCOMYCIN HYDROCHLORIDE 1000 MG: 1 INJECTION, SOLUTION INTRAVENOUS at 06:12

## 2021-10-18 RX ADMIN — FENTANYL CITRATE 25 MCG: 0.05 INJECTION, SOLUTION INTRAMUSCULAR; INTRAVENOUS at 07:28

## 2021-10-18 RX ADMIN — PREGABALIN 150 MG: 75 CAPSULE ORAL at 06:10

## 2021-10-18 RX ADMIN — ONDANSETRON 4 MG: 2 INJECTION INTRAMUSCULAR; INTRAVENOUS at 08:26

## 2021-10-18 RX ADMIN — FENTANYL CITRATE 25 MCG: 0.05 INJECTION, SOLUTION INTRAMUSCULAR; INTRAVENOUS at 08:39

## 2021-10-18 RX ADMIN — PROPOFOL 150 MG: 10 INJECTION, EMULSION INTRAVENOUS at 07:06

## 2021-10-18 RX ADMIN — MAGNESIUM SULFATE HEPTAHYDRATE 2 G: 500 INJECTION, SOLUTION INTRAMUSCULAR; INTRAVENOUS at 07:11

## 2021-10-18 RX ADMIN — CEFAZOLIN SODIUM 2 G: 2 INJECTION, SOLUTION INTRAVENOUS at 06:55

## 2021-10-18 RX ADMIN — FENTANYL CITRATE 50 MCG: 0.05 INJECTION, SOLUTION INTRAMUSCULAR; INTRAVENOUS at 06:43

## 2021-10-18 RX ADMIN — PROPOFOL 200 MCG/KG/MIN: 10 INJECTION, EMULSION INTRAVENOUS at 07:08

## 2021-10-18 RX ADMIN — FENTANYL CITRATE 25 MCG: 0.05 INJECTION, SOLUTION INTRAMUSCULAR; INTRAVENOUS at 08:35

## 2021-10-18 RX ADMIN — LIDOCAINE HYDROCHLORIDE 100 MG: 20 INJECTION, SOLUTION INFILTRATION; PERINEURAL at 07:06

## 2021-10-18 RX ADMIN — PROPOFOL 50 MG: 10 INJECTION, EMULSION INTRAVENOUS at 07:28

## 2021-10-18 RX ADMIN — ROPIVACAINE HYDROCHLORIDE 30 ML: 5 INJECTION, SOLUTION EPIDURAL; INFILTRATION; PERINEURAL at 06:53

## 2021-10-18 RX ADMIN — MELOXICAM 15 MG: 15 TABLET ORAL at 06:10

## 2021-10-18 RX ADMIN — SODIUM CHLORIDE, POTASSIUM CHLORIDE, SODIUM LACTATE AND CALCIUM CHLORIDE 9 ML/HR: 600; 310; 30; 20 INJECTION, SOLUTION INTRAVENOUS at 06:40

## 2021-10-18 RX ADMIN — MIDAZOLAM 1 MG: 1 INJECTION INTRAMUSCULAR; INTRAVENOUS at 06:43

## 2021-10-18 NOTE — CASE MANAGEMENT/SOCIAL WORK
Continued Stay Note  [facility]     Patient Name: Reina Zaman  MRN: [id number]  Today's Date: 10/18/2021    Admit Date: 10/18/2021     Discharge Plan     Row Name 10/18/21 1557       Plan    Plan Home with family support & [facility].    Patient/Family in Agreement with Plan yes    Plan Comments [facility] is unable to accept at this time due to staffing. Updated the patient who's agreeable and requests [facility]. Referral sent in [facility]. No other needs identified.    Row Name 10/18/21 6948       Plan    Plan Home with family support & [facility].    Patient/Family in Agreement with Plan yes    Plan Comments Spoke with the patient, verified current information and explained the role of the CCP. Patient said she lives with her /Everardo and has family support. Patient was screened by BRAULIO Gonzalez/CCP PTA. Patient plans to d/c home with family support & HH PT. She requests [facility]. Referral sent in [facility]. Patient also said she plans for her /Everardo to transport her home by car at d/c. No other needs identified.               Discharge Codes    No documentation.               Expected Discharge Date and Time     Expected Discharge Date Expected Discharge Time    Oct 19, 2021             Nikia Maguire RN

## 2021-10-18 NOTE — CASE MANAGEMENT/SOCIAL WORK
Discharge Planning Assessment  [facility]     Patient Name: Reina Zaman  MRN: [id number]  Today's Date: 10/18/2021    Admit Date: 10/18/2021     Discharge Needs Assessment    No documentation.                Discharge Plan     Row Name 10/18/21 1539       Plan    Plan Home with family support & [facility].    Patient/Family in Agreement with Plan yes    Plan Comments Spoke with the patient, verified current information and explained the role of the CCP. Patient said she lives with her /Everardo and has family support. Patient was screened by BRAULIO Gonzalez/CCP PTA. Patient plans to d/c home with family support & HH PT. She requests [facility]. Referral sent in [facility]. Patient also said she plans for her /Everardo to transport her home by car at d/c. No other needs identified.              Continued Care and Services - Admitted Since 10/18/2021     Home Medical Care     Service Provider Request Status Selected Services Address Phone Fax Patient Preferred    [facility]  Pending - Request Sent N/A [address] [facility] [address] [phone] --              Expected Discharge Date and Time     Expected Discharge Date Expected Discharge Time    Oct 19, 2021           Nikia Maguire RN

## 2021-10-18 NOTE — ANESTHESIA POSTPROCEDURE EVALUATION
Patient: Reina Zaman    Procedure Summary     Date: 10/18/21 Room / Location: [facility] OR [address] [place] [address] MAIN OR    Anesthesia Start: 0659 Anesthesia Stop: 0848    Procedure: TOTAL KNEE ARTHROPLASTY (Right Knee) Diagnosis:       Primary osteoarthritis of right knee      (Primary osteoarthritis of right knee [M17.11])    Surgeons: Jason Gerard MD Provider: Cathy Mena MD    Anesthesia Type: general with block ASA Status: 3          Anesthesia Type: general with block    Vitals  Vitals Value Taken Time   /72 10/18/21 1031   Temp 36.3 °C (97.4 °F) 10/18/21 1030   Pulse 63 10/18/21 1038   Resp 16 10/18/21 1030   SpO2 100 % 10/18/21 1038   Vitals shown include unvalidated device data.        Post Anesthesia Care and Evaluation    Patient location during evaluation: bedside  Patient participation: complete - patient participated  Level of consciousness: awake and alert  Pain management: adequate  Airway patency: patent  Anesthetic complications: No anesthetic complications    Cardiovascular status: acceptable  Respiratory status: acceptable  Hydration status: acceptable    Comments: /72   Pulse 55   Temp 36.3 °C (97.4 °F) (Oral)   Resp 16   SpO2 100%

## 2021-10-18 NOTE — ANESTHESIA PROCEDURE NOTES
Airway  Urgency: elective    Date/Time: 10/18/2021 7:08 AM  Airway not difficult    General Information and Staff    Patient location during procedure: OR  Anesthesiologist: Cathy Mena MD  CRNA: Cate Gutierrez CRNA    Indications and Patient Condition  Indications for airway management: airway protection    Preoxygenated: yes  MILS maintained throughout  Mask difficulty assessment: 0 - not attempted    Final Airway Details  Final airway type: supraglottic airway      Successful airway: classic  Size 3    Number of attempts at approach: 1  Assessment: lips, teeth, and gum same as pre-op    Additional Comments  Placed with ease. Vent with ease. Teeth as pre-op. Secured to face.

## 2021-10-18 NOTE — DISCHARGE PLACEMENT REQUEST
"Reina Zaman (71 y.o. Female)             Date of Birth Social Security Number Address Home Phone MRN    1950  [facility] [place] [address] [phone] [id number]    L[employer]-day Marital Status             None        Admission Date Admission Type Admitting Provider Attending Provider Department, Room/Bed    10/18/21 Elective aJson Gerard MD Brown, Reid B, MD [address], [facility]/1    Discharge Date Discharge Disposition Discharge Destination                         Attending Provider: Jaosn Gerard MD    Allergies: No Known Allergies    Isolation: None   Infection: None   Code Status: Not on file   Advance Care Planning Activity    Ht: 157.5 cm (62\")   Wt: 62.1 kg (137 lb)    Admission Cmt: None   Principal Problem: Primary osteoarthritis of right knee [M17.11] More...                 Active Insurance as of 10/18/2021     Primary Coverage     Payor Plan Insurance Group Employer/Plan Group    MEDICARE MEDICARE A & B      Payor Plan Address Payor Plan Phone Number Payor Plan Fax Number Effective Dates    [address] [phone]  1/1/2013 - None Entered    [facility] [id number]       Subscriber Name Subscriber Birth Date Member ID       REINA ZAMAN 1950 [id number]           Secondary Coverage     Payor Plan Insurance Group Employer/Plan Group    ANTH BLUE CROSS [facility] SUPP [id number]     Payor Plan Address Payor Plan Phone Number Payor Plan Fax Number Effective Dates    [address]   12/1/2016 - None Entered    [facility] [id number]       Subscriber Name Subscriber Birth Date Member ID       REINA ZAMAN 1950 [id number]                 Emergency Contacts      (Rel.) Home Phone Work Phone Mobile Phone    Everardo Stevenson (Spouse) -- -- [phone]              "

## 2021-10-18 NOTE — PLAN OF CARE
Goal Outcome Evaluation:              Outcome Summary: Pt s/p RTKA. AxO x4. VSS. NVI. HV in place. Ana, ace-wrap in place, CDI. Voiding function intact, up with assist x1 to bathroom. No complaints of pain on this shift. Plans to d/c tomorrow. Educated pt on treatment plan, verbalized understanding. Will continue to monitor.

## 2021-10-18 NOTE — OP NOTE
Name: Reina Zaman  YOB: 1950    DATE OF SURGERY: 10/18/2021    PREOPERATIVE DIAGNOSIS: Right knee end-stage osteoarthritis    POSTOPERATIVE DIAGNOSIS: Right knee end-stage osteoarthritis    PROCEDURE PERFORMED: Right total knee replacement    SURGEON: Jason Gerard M.D.    ASSISTANT: YURI WALKER    A surgical assistant was integral in ensuring a successful outcome with this procedure.  The assistant was utilized to assist in positioning the patient, draping the patient, was used throughout the case to provide with retraction of tissues, suctioning of blood and body fluids for visualization, positioning of the extremity to allow for proper exposure so that I could perform the procedure.  Without the use of a surgical assistant during this procedure I feel that the outcome may have been compromised or would have been suboptimal or at risk for complications.    IMPLANTS: [employer]:     Implant Name Type Inv. Item Serial No.  Lot No. LRB No. Used Action   CMT BONE PALACOS R HI/VISC 1X40 - [id number] Implant CMT BONE PALACOS R HI/VISC 1X40  [facility] [id number] Right 1 Implanted   DEV CONTRL TISS STRATAFIX SYMM PDS PLUS SHAHRZAD CT-1 60CM - [id number] Implant DEV CONTRL TISS STRATAFIX SYMM PDS PLUS SHAHRZAD CT-1 60CM  [employer]  [employer] [id number] Right 1 Implanted   DEV CONTRL TISS STRATAFIXSPIRALMNCRYL PLSPS2 REV3/0 45CM - [id number] Implant DEV CONTRL TISS STRATAFIXSPIRALMNCRYL PLSPS2 REV3/0 45CM  [employer]  DIV [employer] J [employer] J RGBESS Right 1 Implanted   BASE TIB/KN GEN2 NONPOR TI SZ2 RT - [id number] Implant BASE TIB/KN GEN2 NONPOR TI SZ2 RT  [employer] [id number] Right 1 Implanted   COMP FEM LEGION OXINIUM CR SZ3 RT - [id number] Implant COMP FEM LEGION OXINIUM CR SZ3 RT  [employer] [id number] Right 1 Implanted   PAT GEN2 BICONVEX 13N89EX - [id number] Implant PAT GEN2 BICONVEX [id number]  [employer] [id number] Right 1 Implanted   INSRT ART LEGION CR HF XLPE SZ1TO2 9MM -  [id number] Implant INSRT ART LEGION CR HF XLPE SZ1TO2 9MM  [employer] [id number] Right 1 Implanted       Estimated Blood Loss: 200cc  Specimens : none  Complications: none    DESCRIPTION OF PROCEDURE: The patient was taken to the operating room and placed in the supine position. A sequential compression device was carefully placed on the non-operative leg. Preoperative antibiotics were administered. Surgical time out was performed. After adequate induction of anesthesia, the leg was prepped and draped in the usual sterile fashion, exsanguinated with an Esmarch bandage and the tourniquet inflated to 250 mmHg. A midline incision was performed followed by a medial parapatellar arthrotomy. The patella was subluxed laterally.  A portion of the fat pad, ACL, and anterior horns of the meniscus were excised. The drill hole was placed in the distal femur and the canal was the irrigated and suctioned. The IM hermelinda was placed and a 5 degree distal valgus cut was performed on the femur. The femur was then sized with a sizing guide. The femoral cutting block was placed and all femoral cuts were performed. The proximal tibia was exposed. We used the extramedullary tibial cutting guide set for removal of 9mm of bone off the high side. The tibial cut was performed. The posterior horns of the menisci were excised. The posterior osteophytes were removed. Flexion extension blocks were then used to balance the knee. The tibial cut surface was then sized with the sizing templates and the tibial and femoral trial were then placed. The knee was placed in full extension and then the tibial tray rotation was then matched to the femoral rotation and marked.    Attention was then placed to the patella. The patella was noted to track centrally through range of motion. The patella was then sized with the trials. The thickness of the patella was then measured. The patella was resurfaced and the surrounding osteophytes were removed. The  preoperative thickness was reproduced. The patella tracked centrally through range of motion.   At this point all trial components were removed, the knee was copiously irrigated with pulsed lavage, and the knee was injected with anesthetic cocktail solution. The cut surfaces were then dried with clean lap sponges, and the components were cemented tibia, followed by femur, then patella. The knee was held in full extension and all excess cement was removed. The knee was held still until the cement had completely hardened. We then placed the trial polyethylene spacer which resulted in full extension and excellent flexion-extension balance. We placed the final polyethylene spacer.   The knee was then copiously irrigated. The tourniquet was then released. There was excellent hemostasis. We placed a one-eighth inch Hemovac drain. We closed the knee in multiple layers in standard fashion. Sterile dressing were applied. At the end of the case, the sponge and needle counts were reported as being correct. There were no known complications. The patient was then transported to the recovery room.      Jason Gerard M.D.

## 2021-10-18 NOTE — ANESTHESIA PROCEDURE NOTES
Peripheral Block      Patient location during procedure: pre-op  Start time: 10/18/2021 6:43 AM  Stop time: 10/18/2021 6:53 AM  Reason for block: at surgeon's request and post-op pain management  Performed by  Anesthesiologist: Cathy Mena MD  Preanesthetic Checklist  Completed: patient identified, IV checked, site marked, risks and benefits discussed, surgical consent, monitors and equipment checked, pre-op evaluation and timeout performed  Prep:  Pt Position: supine  Sterile barriers:gloves and alcohol skin prep  Prep: ChloraPrep  Patient monitoring: continuous pulse oximetry, blood pressure monitoring and EKG  Procedure  Sedation:yes    Guidance:ultrasound guided  ULTRASOUND INTERPRETATION.  Using ultrasound guidance a 20 G gauge needle was placed in close proximity to the femoral nerve, at which point, under ultrasound guidance anesthetic was injected in the area of the nerve and spread of the anesthesia was seen on ultrasound in close proximity thereto.  There were no abnormalities seen on ultrasound; a digital image was taken; and the patient tolerated the procedure with no complications. Images:still images obtained    Laterality:right  Block Type:adductor canal block  Injection Technique:single-shot  Needle Type:echogenic  Needle Gauge:20 G      Medications Used: ropivacaine (NAROPIN) 0.5 % injection, 30 mL      Post Assessment  Injection Assessment: negative aspiration for heme, no paresthesia on injection and incremental injection  Patient Tolerance:comfortable throughout block  Complications:no

## 2021-10-18 NOTE — THERAPY EVALUATION
Patient Name: Reina Zaman  : 1950    MRN: [id number]                              Today's Date: 10/18/2021       Admit Date: 10/18/2021    Visit Dx:     ICD-10-CM ICD-9-CM   1. S/P TKR (total knee replacement), right  Z96.651 V43.65   2. Primary osteoarthritis of right knee  M17.11 715.16     Patient Active Problem List   Diagnosis   • FH: colon cancer   • Adenomatous polyp of colon   • Dysphagia   • Globus sensation   • Gastroesophageal reflux disease   • NSAID long-term use   • Primary osteoarthritis of right knee     Past Medical History:   Diagnosis Date   • Arthritis     osteo   • Asthma    • Atrial fibrillation (HCC)    • Colon polyp    • Endometriosis    • GERD (gastroesophageal reflux disease)    • Hyperlipidemia    • MVP (mitral valve prolapse)    • Osteoporosis    • Squamous cell carcinoma     skin squamous     Past Surgical History:   Procedure Laterality Date   • APPENDECTOMY      removed with colon surgery   • COLON SURGERY  1983    bowel blockage   • COLONOSCOPY  2015   • COLONOSCOPY N/A 3/20/2018    Procedure: COLONOSCOPY INTO CECUM AND TI WITH HOT SNARE POLYPECTOMIES, SALINE LIFT;  Surgeon: Lidia Laura MD;  Location: [facility] ENDOSCOPY;  Service: Gastroenterology   • COLONOSCOPY N/A 2021    Procedure: COLONOSCOPY TO CECUM WITH COLD SNARE POLYPECTOMIES;  Surgeon: Lidia Laura MD;  Location: [facility] ENDOSCOPY;  Service: Gastroenterology;  Laterality: N/A;  PRE OP - PERS H/O POLYPS  POST OP - COLON POLYPS, HEMORRHOIDS   • ENDOSCOPY N/A 2021    Procedure: ESOPHAGOGASTRODUODENOSCOPY WITH BIOPSIES AND BALLOON DILATION 15, 16.5, 18;  Surgeon: Lidia Laura MD;  Location: [facility] ENDOSCOPY;  Service: Gastroenterology;  Laterality: N/A;  PRE OP - GERD, DYSPHAGIA  POST OP- GASTRITIS, SM HIATAL HERNIA, SCHOTSKIES RING   • FULGURATION ENDOMETRIOSIS     • HYSTERECTOMY     • SKIN CANCER EXCISION        General Information     Row Name 10/18/21 1317          Physical Therapy  Time and Intention    Document Type evaluation  -PC     Mode of Treatment physical therapy  -PC     Row Name 10/18/21 1317          General Information    Patient Profile Reviewed yes  -PC     Prior Level of Function independent:  -PC     Existing Precautions/Restrictions fall  -PC     Row Name 10/18/21 1317          Living Environment    Lives With spouse  -PC     Row Name 10/18/21 1317          Home Main Entrance    Number of Stairs, Main Entrance four  -PC     Stair Railings, Main Entrance none  -PC     Row Name 10/18/21 1317          Stairs Within Home, Primary    Stairs, Within Home, Primary bedroom is upstairs  -PC     Stair Railings, Within Home, Primary railings safe and in good condition  -PC     Row Name 10/18/21 1317          Cognition    Orientation Status (Cognition) oriented x 4  -PC     Row Name 10/18/21 1317          Safety Issues, Functional Mobility    Impairments Affecting Function (Mobility) endurance/activity tolerance; strength; pain; range of motion (ROM)  -PC     Comment, Safety Issues/Impairments (Mobility) still a little groggy from surgery  -PC           User Key  (r) = Recorded By, (t) = Taken By, (c) = Cosigned By    Initials Name Provider Type    PC Isabela Schneider PT Physical Therapist               Mobility     Row Name 10/18/21 1318          Bed Mobility    Bed Mobility supine-sit  -PC     Supine-Sit [place] (Bed Mobility) standby assist  -PC     Row Name 10/18/21 1318          Sit-Stand Transfer    Sit-Stand [place] (Transfers) contact guard; verbal cues  -PC     Assistive Device (Sit-Stand Transfers) walker, front-wheeled  -PC     Row Name 10/18/21 1318          Gait/Stairs (Locomotion)    [place] Level (Gait) contact guard; verbal cues  -PC     Assistive Device (Gait) walker, front-wheeled  -PC     Distance in Feet (Gait) 60 ft  -PC     Deviations/Abnormal Patterns (Gait) antalgic; gait speed decreased; stride length decreased  -PC     Bilateral Gait Deviations  forward flexed posture; heel strike decreased  -PC     Row Name 10/18/21 1318          Mobility    Extremity Weight-bearing Status right lower extremity  -PC     Right Lower Extremity (Weight-bearing Status) weight-bearing as tolerated (WBAT)  -PC           User Key  (r) = Recorded By, (t) = Taken By, (c) = Cosigned By    Initials Name Provider Type    PC Isabela Schneider, PT Physical Therapist               Obj/Interventions     Row Name 10/18/21 1319          Range of Motion Comprehensive    Comment, General Range of Motion WFL x R knee  -PC     Row Name 10/18/21 1319          Strength Comprehensive (MMT)    Comment, General Manual Muscle Testing (MMT) Assessment WNL x R LE, pt can independently SLR  -PC     Row Name 10/18/21 1319          Motor Skills    Therapeutic Exercise --  5 reps TKR ex  -PC     Row Name 10/18/21 1319          Sensory Assessment (Somatosensory)    Sensory Assessment (Somatosensory) sensation intact  -PC           User Key  (r) = Recorded By, (t) = Taken By, (c) = Cosigned By    Initials Name Provider Type    PC Isabela Schneider, PT Physical Therapist               Goals/Plan     Row Name 10/18/21 1323          Bed Mobility Goal 1 (PT)    Activity/Assistive Device (Bed Mobility Goal 1, PT) sit to supine/supine to sit  -PC     [place] Level/Cues Needed (Bed Mobility Goal 1, PT) independent  -PC     Time Frame (Bed Mobility Goal 1, PT) 1 week  -PC     Row Name 10/18/21 1323          Transfer Goal 1 (PT)    Activity/Assistive Device (Transfer Goal 1, PT) sit-to-stand/stand-to-sit  -PC     [place] Level/Cues Needed (Transfer Goal 1, PT) standby assist  -PC     Time Frame (Transfer Goal 1, PT) 1 week  -PC     Row Name 10/18/21 1323          Gait Training Goal 1 (PT)    Activity/Assistive Device (Gait Training Goal 1, PT) gait (walking locomotion); assistive device use  -PC     [place] Level (Gait Training Goal 1, PT) standby assist  -PC     Distance (Gait Training Goal 1, PT) 80 ft   -PC     Time Frame (Gait Training Goal 1, PT) 1 week  -PC     Row Name 10/18/21 1323          ROM Goal 1 (PT)    ROM Goal 1 (PT) 5-90  -PC     Time Frame (ROM Goal 1, PT) 1 week  -PC     Row Name 10/18/21 1325          Stairs Goal 1 (PT)    Activity/Assistive Device (Stairs Goal 1, PT) ascending stairs; descending stairs  -PC     [place] Level/Cues Needed (Stairs Goal 1, PT) contact guard assist  -PC     Number of Stairs (Stairs Goal 1, PT) 4  -PC     Time Frame (Stairs Goal 1, PT) 1 week  -PC           User Key  (r) = Recorded By, (t) = Taken By, (c) = Cosigned By    Initials Name Provider Type    PC Isabela Schneider, PT Physical Therapist               Clinical Impression     Row Name 10/18/21 1320          Pain    Additional Documentation Pain Scale: Numbers Pre/Post-Treatment (Group)  -PC     Row Name 10/18/21 1320          Pain Scale: Numbers Pre/Post-Treatment    Pretreatment Pain Rating 2/10  -PC     Pain Location - Side Right  -PC     Pain Location knee  -PC     Pain Intervention(s) Medication (See MAR); Repositioned; Cold applied  -PC     Row Name 10/18/21 1320          Plan of Care Review    Plan of Care Reviewed With patient  -PC     Outcome Summary Pt is POD 0 R TKR, presents with dec strength and ROM R LE and impaired functional mobility and activity tolerance, she will benefit from PT to address. Pt did well this afternoon, able to walk 60 ft with Rwx and CGA, plans home tomorrow with assist, lives with spouse, Rwx ordered for pt and will work on stairs tomorrow prior to discharge, pt has 4 steps to enter home and bedrooms are on second floor  -PC     Row Name 10/18/21 1323          Therapy Assessment/Plan (PT)    Rehab Potential (PT) good, to achieve stated therapy goals  -PC     Criteria for Skilled Interventions Met (PT) yes; meets criteria  -PC     Row Name 10/18/21 1320          Positioning and Restraints    Pre-Treatment Position in bed  -PC     Post Treatment Position chair  -PC     In Chair  reclined; call light within reach; encouraged to call for assist; exit alarm on  -PC           User Key  (r) = Recorded By, (t) = Taken By, (c) = Cosigned By    Initials Name Provider Type    PC Isabela Schneider PT Physical Therapist               Outcome Measures     Row Name 10/18/21 1324          How much help from another person do you currently need...    Turning from your back to your side while in flat bed without using bedrails? 4  -PC     Moving from lying on back to sitting on the side of a flat bed without bedrails? 4  -PC     Moving to and from a bed to a chair (including a wheelchair)? 3  -PC     Standing up from a chair using your arms (e.g., wheelchair, bedside chair)? 3  -PC     Climbing 3-5 steps with a railing? 3  -PC     To walk in hospital room? 3  -PC     AM-PAC 6 Clicks Score (PT) 20  -PC     Row Name 10/18/21 1324          Functional Assessment    Outcome Measure Options AM-PAC 6 Clicks Basic Mobility (PT)  -PC           User Key  (r) = Recorded By, (t) = Taken By, (c) = Cosigned By    Initials Name Provider Type    PC Isabela Schneider PT Physical Therapist                             Physical Therapy Education                 Title: PT OT SLP Therapies (In Progress)     Topic: Physical Therapy (In Progress)     Point: Mobility training (In Progress)     Learning Progress Summary           Patient Acceptance, E,D, NR by  at 10/18/2021 1324                   Point: Home exercise program (In Progress)     Learning Progress Summary           Patient Acceptance, E,D, NR by  at 10/18/2021 1324                   Point: Body mechanics (In Progress)     Learning Progress Summary           Patient Acceptance, E,D, NR by  at 10/18/2021 1324                   Point: Precautions (In Progress)     Learning Progress Summary           Patient Acceptance, E,D, NR by  at 10/18/2021 1324                               User Key     Initials Effective Dates Name Provider Type [facility] 06/16/21 -   Isabela Schneider PT Physical Therapist PT              PT Recommendation and Plan  Planned Therapy Interventions (PT): bed mobility training, gait training, transfer training, ROM (range of motion), stair training, strengthening  Plan of Care Reviewed With: patient  Outcome Summary: Pt is POD 0 R TKR, presents with dec strength and ROM R LE and impaired functional mobility and activity tolerance, she will benefit from PT to address. Pt did well this afternoon, able to walk 60 ft with Rwx and CGA, plans home tomorrow with assist, lives with spouse, Rwx ordered for pt and will work on stairs tomorrow prior to discharge, pt has 4 steps to enter home and bedrooms are on second floor     Time Calculation:    PT Charges     Row Name 10/18/21 1325             Time Calculation    Start Time 1244  -PC      Stop Time 1315  -PC      Time Calculation (min) 31 min  -PC      PT Received On 10/18/21  -PC      PT - Next Appointment 10/19/21  -PC      PT Goal Re-Cert Due Date 10/25/21  -PC              Time Calculation- PT    Total Timed Code Minutes- PT 26 minute(s)  -PC            User Key  (r) = Recorded By, (t) = Taken By, (c) = Cosigned By    Initials Name Provider Type    PC Isabela Schneider PT Physical Therapist              Therapy Charges for Today     Code Description Service Date Service Provider Modifiers Qty    97570432628 HC PT THER PROC EA 15 MIN 10/18/2021 Isabela Schneider, PT GP 1    00012420991 HC PT EVAL LOW COMPLEXITY 2 10/18/2021 Isabela Schneider, PT GP 1          PT G-Codes  Outcome Measure Options: AM-PAC 6 Clicks Basic Mobility (PT)  AM-PAC 6 Clicks Score (PT): 20    Isabela Schneider PT  10/18/2021

## 2021-10-18 NOTE — PLAN OF CARE
Goal Outcome Evaluation:  Plan of Care Reviewed With: patient           Outcome Summary: Pt is POD 0 R TKR, presents with dec strength and ROM R LE and impaired functional mobility and activity tolerance, she will benefit from PT to address. Pt did well this afternoon, able to walk 60 ft with Rwx and CGA, plans home tomorrow with assist, lives with spouse, Rwx ordered for pt and will work on stairs tomorrow prior to discharge, pt has 4 steps to enter home and bedrooms are on second floor  Patient was intermittently wearing a face mask during this therapy encounter. Therapist used appropriate personal protective equipment including eye protection, mask, and gloves.  Mask used was standard procedure mask. Appropriate PPE was worn during the entire therapy session. Hand hygiene was completed before and after therapy session. Patient is not in enhanced droplet precautions.

## 2021-10-18 NOTE — ANESTHESIA PREPROCEDURE EVALUATION
Anesthesia Evaluation                  Airway   Mallampati: II  TM distance: >3 FB  Neck ROM: full  No difficulty expected  Dental - normal exam     Pulmonary - normal exam   (+) asthma,  Cardiovascular - normal exam    (+) valvular problems/murmurs MVP, dysrhythmias Atrial Fib, hyperlipidemia,       Neuro/Psych  GI/Hepatic/Renal/Endo    (+)  GERD,      Musculoskeletal     Abdominal    Substance History      OB/GYN          Other   arthritis,    history of cancer                    Anesthesia Plan    ASA 3     general with block     intravenous induction     Anesthetic plan, all risks, benefits, and alternatives have been provided, discussed and informed consent has been obtained with: patient.

## 2021-10-19 ENCOUNTER — TELEPHONE (OUTPATIENT)
Dept: ORTHOPEDIC SURGERY | Facility: CLINIC | Age: 71
End: 2021-10-19

## 2021-10-19 ENCOUNTER — HOME HEALTH ADMISSION (OUTPATIENT)
Dept: HOME HEALTH SERVICES | Facility: HOME HEALTHCARE | Age: 71
End: 2021-10-19

## 2021-10-19 VITALS
DIASTOLIC BLOOD PRESSURE: 63 MMHG | BODY MASS INDEX: 25.21 KG/M2 | HEIGHT: 62 IN | HEART RATE: 61 BPM | OXYGEN SATURATION: 96 % | WEIGHT: 137 LBS | TEMPERATURE: 97 F | RESPIRATION RATE: 16 BRPM | SYSTOLIC BLOOD PRESSURE: 103 MMHG

## 2021-10-19 PROBLEM — Z96.651 S/P TKR (TOTAL KNEE REPLACEMENT), RIGHT: Status: ACTIVE | Noted: 2021-10-19

## 2021-10-19 PROCEDURE — A9270 NON-COVERED ITEM OR SERVICE: HCPCS | Performed by: NURSE PRACTITIONER

## 2021-10-19 PROCEDURE — G0378 HOSPITAL OBSERVATION PER HR: HCPCS

## 2021-10-19 PROCEDURE — 63710000001 ASPIRIN 81 MG TABLET DELAYED-RELEASE: Performed by: NURSE PRACTITIONER

## 2021-10-19 PROCEDURE — 63710000001 PANTOPRAZOLE 40 MG TABLET DELAYED-RELEASE: Performed by: NURSE PRACTITIONER

## 2021-10-19 PROCEDURE — 97110 THERAPEUTIC EXERCISES: CPT

## 2021-10-19 PROCEDURE — 63710000001 HYDROCODONE-ACETAMINOPHEN 7.5-325 MG TABLET: Performed by: NURSE PRACTITIONER

## 2021-10-19 PROCEDURE — 97116 GAIT TRAINING THERAPY: CPT

## 2021-10-19 RX ADMIN — PANTOPRAZOLE SODIUM 40 MG: 40 TABLET, DELAYED RELEASE ORAL at 08:27

## 2021-10-19 RX ADMIN — ASPIRIN 81 MG: 81 TABLET, COATED ORAL at 08:27

## 2021-10-19 RX ADMIN — HYDROCODONE BITARTRATE AND ACETAMINOPHEN 1 TABLET: 7.5; 325 TABLET ORAL at 11:20

## 2021-10-19 NOTE — TELEPHONE ENCOUNTER
Caller: AMBROSE ARELLANO    Relationship to patient: SELF    Best call back number:     Patient is needing: WASN'T ALEISHA TO WARM TRANSFER....PATIENT HAD SURGERY DAY BEFORE YESTERDAY - WHERE TUBE FOR DRAINAGE WAS TAKEN OUT AT DISCHARGED FROM HOSPITAL - PATIENT ADVISES IS STILL BLEEDING.  PLEASE GIVE PATIENT A CALLBACK.

## 2021-10-19 NOTE — DISCHARGE SUMMARY
Patient Name: Reina Zaman  Patient YOB: 1950    Date of Admission:  10/18/2021  Date of Discharge:  10/19/2021  Discharge Diagnosis: [place] TOTAL KNEE ARTHROPLASTY [45087] (TOTAL KNEE ARTHROPLASTY)  Presenting Problem/History of Present Illness: Primary osteoarthritis of right knee [M17.11]  Admitting Physician: Dr Jason Gerard  Consults:   Consults     No orders found for last 30 day(s).          DETAILS OF HOSPITAL STAY:  Patient is a 71 y.o. female was admitted to the floor following the above procedure and underwent an uncomplicated hospital stay.  Patient did well with physical therapy and was ambulating well without problems.  On the day of discharge the wound was clean, dry and intact and calf was soft and non tender and Homans sign was negative.  Patient was tolerating   Diet Instructions     Advance Diet as Tolerated      May advance diet as tolerated while in hospital.    Diet:      Diet Texture / Consistency: Regular       without problems.  Patient will be discharged home.    Condition on Discharge:  Stable    Vital Signs  Temp:  [94.7 °F (34.8 °C)-97.4 °F (36.3 °C)] 97 °F (36.1 °C)  Heart Rate:  [53-64] 61  Resp:  [12-17] 16  BP: (100-136)/(58-80) 103/63    LABS:      No visits with results within 3 Day(s) from this visit.   Latest known visit with results is:   Lab on 10/15/2021   Component Date Value Ref Range Status   • COVID19 10/15/2021 Not Detected  Not Detected - Ref. Range Final       No results found.    Discharge Medications     Discharge Medications      New Medications      Instructions Start Date   Aspirin Adult Low Strength 81 MG EC tablet  Generic drug: aspirin  Replaces: aspirin 81 MG chewable tablet   Take 1 tablet by mouth twice daily for 14 days; then take 1 tablet by mouth daily for 28 days.      HYDROcodone-acetaminophen 7.5-325 MG per tablet  Commonly known as: NORCO   Take 1-2 tablets by mouth every 4-6 hours as needed for pain.      meloxicam 7.5 MG tablet  Commonly  known as: MOBIC   7.5 mg, Oral, Daily      MiraLax 17 GM/SCOOP powder  Generic drug: polyethylene glycol   17 g, Oral, 2 Times Daily      ondansetron 4 MG tablet  Commonly known as: Zofran   4 mg, Oral, Every 8 Hours PRN         Changes to Medications      Instructions Start Date   pantoprazole 40 MG EC tablet  Commonly known as: PROTONIX  What changed: Another medication with the same name was added. Make sure you understand how and when to take each.   TAKE 1 TABLET BY MOUTH EVERY DAY      pantoprazole 40 MG EC tablet  Commonly known as: PROTONIX  What changed: You were already taking a medication with the same name, and this prescription was added. Make sure you understand how and when to take each.   40 mg, Oral, Daily         Continue These Medications      Instructions Start Date   fexofenadine 180 MG tablet  Commonly known as: ALLEGRA   180 mg, Oral, Daily      fluticasone 50 MCG/ACT nasal spray  Commonly known as: FLONASE   2 sprays, Nasal, Daily      metoprolol succinate XL 25 MG 24 hr tablet  Commonly known as: TOPROL-XL   12.5 mg, Oral, Every Evening         Stop These Medications    Alpha-Lipoic Acid 200 MG capsule     aspirin 81 MG chewable tablet  Replaced by: Aspirin Adult Low Strength 81 MG EC tablet     CALCIUM 600+D PO     Chlorhexidine Gluconate Cloth 2 % pads     Coenzyme Q10 200 MG capsule     GRETTA C PO     multivitamin with minerals tablet tablet     mupirocin 2 % nasal ointment  Commonly known as: BACTROBAN     NON FORMULARY     OSTEO BI-FLEX ADV TRIPLE ST PO     [facility] PO     SUPER B COMPLEX PO     TURMERIC PO     TYLENOL 8 HOUR ARTHRITIS PAIN PO            Discharge Instructions: Patient is to continue with physical therapy exercises daily and continue working with the physical therapist as ordered. Patient may weight bear as tolerated. Apply ice regularly. Patient may ice for long periods of time as long as ice is not directly on the skin. Patient instructed on frequent calf  pumping exercises.  Patient also instructed on incentive spirometer during hospitalization and encouraged to continue to use at home regularly.    Dressing: The dressing is designed to be left in place until you return to the office in 2 weeks.  The suction unit should stop functioning at 7 days and the green light will switch to yellow.  At that point the suction unit and tubing can be disconnected at the port closest to the dressing.  The suction unit and tubing may be discarded.  You may shower immediately upon return home, you will need to turn the pump off by depressing the orange button once and then you may disconnect the pump and tubing at the connection port.  After showering, shake off the excess water and reattach the tubing.  Restart the pump by depressing the orange button one time and you will notice the green light flashing again.  If the dressing becomes disloged or saturated it should be changed. Please refer to the VIVEK information sheet if you have any questions about the dressing.  If you have a home health nurse or therapist they can be contacted to assist with dressing change or repair. You may also call the VIVEK dressing hotline for questions related to the dressing ([phone]). If there still other problems or questions related to the dressing despite these measures then you can contact Anay at our office [phone].  If for some reason the VIVEK dressing is removed, after 7 days the wound can be gently cleaned with antibacterial soap then allowed to dry and covered with a dry sterile dressing. The wound should be covered at all times except while showering.  Patient may change dressings daily and prn using sterile 4x4 and paper tape, and should call if any unusual drainage, redness or swelling.*  Follow up appointment in 2 weeks - patient to call the office at [phone] to schedule.  Patient will be discharged on aspirin 81mg BID x weeks, then daily x 4weeks    Discharge Diagnosis:     Patient Active Problem List   Diagnosis   • FH: colon cancer   • Adenomatous polyp of colon   • Dysphagia   • Globus sensation   • Gastroesophageal reflux disease   • NSAID long-term use   • Primary osteoarthritis of right knee       Follow-up Appointments  Future Appointments   Date Time Provider Department Center   11/4/2021 10:40 AM Salas Moore APRN [facility] [address] [facility]          SHRUTI Whelan  10/19/21  08:56 EDT

## 2021-10-19 NOTE — PROGRESS NOTES
[facility] will follow for home PT as ordered. Patient agreeable to services. Contact information confirmed. Thank you.

## 2021-10-19 NOTE — TELEPHONE ENCOUNTER
I called and spoke with the patient in regards to her questions and concerns.  Educated her on reinforcing her dressing at the drain site and can use an Ace wrap for compression to help assist with the bleeding control.  Patient also reports her pa dressing has blood noticed on it as well.  I told her to let the physical therapist evaluate the pa dressing tomorrow and should not need to be changed and they have the proper authority to change the dressing.  If not I will see the patient in clinic on Thursday and change the dressing myself.

## 2021-10-19 NOTE — TELEPHONE ENCOUNTER
Patient calling again x2 says that she is still bleeding at the surgical site and would like a return call.

## 2021-10-19 NOTE — PLAN OF CARE
Goal Outcome Evaluation:  Plan of Care Reviewed With: patient        Progress: improving  Outcome Summary: pt did very well this morning, able to walk 200 ft with Rwx in [place] with good performance of exercises and ROM, pt is appropriate to go home today with assist and out pt PT, pt went up and down 4 stairs with min assist, will have assist at home  Patient was intermittently wearing a face mask during this therapy encounter. Therapist used appropriate personal protective equipment including eye protection, mask, and gloves.  Mask used was standard procedure mask. Appropriate PPE was worn during the entire therapy session. Hand hygiene was completed before and after therapy session. Patient is not in enhanced droplet precautions.

## 2021-10-19 NOTE — THERAPY EVALUATION
Patient Name: Riena Zaman  : 1950    MRN: [id number]                              Today's Date: 10/19/2021       Admit Date: 10/18/2021    Visit Dx:     ICD-10-CM ICD-9-CM   1. S/P TKR (total knee replacement), right  Z96.651 V43.65   2. Primary osteoarthritis of right knee  M17.11 715.16     Patient Active Problem List   Diagnosis   • FH: colon cancer   • Adenomatous polyp of colon   • Dysphagia   • Globus sensation   • Gastroesophageal reflux disease   • NSAID long-term use   • Primary osteoarthritis of right knee     Past Medical History:   Diagnosis Date   • Arthritis     osteo   • Asthma    • Atrial fibrillation (HCC)    • Colon polyp    • Endometriosis    • GERD (gastroesophageal reflux disease)    • Hyperlipidemia    • MVP (mitral valve prolapse)    • Osteoporosis    • Squamous cell carcinoma     skin squamous     Past Surgical History:   Procedure Laterality Date   • APPENDECTOMY      removed with colon surgery   • COLON SURGERY  1983    bowel blockage   • COLONOSCOPY  2015   • COLONOSCOPY N/A 3/20/2018    Procedure: COLONOSCOPY INTO CECUM AND TI WITH HOT SNARE POLYPECTOMIES, SALINE LIFT;  Surgeon: Lidia Laura MD;  Location: [facility] ENDOSCOPY;  Service: Gastroenterology   • COLONOSCOPY N/A 2021    Procedure: COLONOSCOPY TO CECUM WITH COLD SNARE POLYPECTOMIES;  Surgeon: Lidia Laura MD;  Location: [facility] ENDOSCOPY;  Service: Gastroenterology;  Laterality: N/A;  PRE OP - PERS H/O POLYPS  POST OP - COLON POLYPS, HEMORRHOIDS   • ENDOSCOPY N/A 2021    Procedure: ESOPHAGOGASTRODUODENOSCOPY WITH BIOPSIES AND BALLOON DILATION 15, 16.5, 18;  Surgeon: Lidia Laura MD;  Location: [facility] ENDOSCOPY;  Service: Gastroenterology;  Laterality: N/A;  PRE OP - GERD, DYSPHAGIA  POST OP- GASTRITIS, SM HIATAL HERNIA, SCHOTSKIES RING   • FULGURATION ENDOMETRIOSIS     • HYSTERECTOMY     • SKIN CANCER EXCISION        General Information     Row Name 10/19/21 0927          Physical Therapy  Time and Intention    Document Type therapy note (daily note)  -PC     Mode of Treatment physical therapy  -PC     Row Name 10/19/21 0927          General Information    Patient Profile Reviewed yes  -PC     Existing Precautions/Restrictions fall  -PC     Row Name 10/19/21 0927          Cognition    Orientation Status (Cognition) oriented x 4  -PC     Row Name 10/19/21 0927          Safety Issues, Functional Mobility    Impairments Affecting Function (Mobility) endurance/activity tolerance; strength; pain; range of motion (ROM)  -PC           User Key  (r) = Recorded By, (t) = Taken By, (c) = Cosigned By    Initials Name Provider Type    PC Isabela Schneider PT Physical Therapist               Mobility     Row Name 10/19/21 0927          Bed Mobility    Bed Mobility supine-sit  -PC     Supine-Sit [place] (Bed Mobility) standby assist  -PC     Row Name 10/19/21 0927          Sit-Stand Transfer    Sit-Stand [place] (Transfers) verbal cues; standby assist  -PC     Assistive Device (Sit-Stand Transfers) walker, front-wheeled  -PC     Row Name 10/19/21 0927          Gait/Stairs (Locomotion)    [place] Level (Gait) verbal cues; standby assist  -PC     Assistive Device (Gait) walker, front-wheeled  -PC     Distance in Feet (Gait) 200 ft  -PC     Deviations/Abnormal Patterns (Gait) antalgic; gait speed decreased; stride length decreased  -     Bilateral Gait Deviations forward flexed posture; heel strike decreased  -     Handrail Location (Stairs) left side (ascending)  HHA  on R side  -PC     Ascending Technique (Stairs) step-to-step  -PC     Descending Technique (Stairs) step-to-step  -PC     Comment (Gait/Stairs) pt has OA L knee with some instability and reported difficulty prior to surgery with L knee also, had difficulty and depended on rail and HHA for stairs, especially descending, pt is safe to get into home and may end up with R LE being her good leg for stair climbing  -     Row Name 10/19/21  0927          Mobility    Extremity Weight-bearing Status right lower extremity  -PC     Right Lower Extremity (Weight-bearing Status) weight-bearing as tolerated (WBAT)  -PC           User Key  (r) = Recorded By, (t) = Taken By, (c) = Cosigned By    Initials Name Provider Type    PC Isabela Schneider, PT Physical Therapist               Obj/Interventions     Row Name 10/19/21 0929          Range of Motion Comprehensive    Comment, General Range of Motion approx 5-95  -PC     Row Name 10/19/21 0929          Motor Skills    Therapeutic Exercise --  10 reps TKR  -PC           User Key  (r) = Recorded By, (t) = Taken By, (c) = Cosigned By    Initials Name Provider Type    PC Isabela Schneider, PT Physical Therapist               Goals/Plan    No documentation.                Clinical Impression     Row Name 10/19/21 0930          Pain Scale: Numbers Pre/Post-Treatment    Pretreatment Pain Rating 0/10 - no pain  -PC     Pain Location - Side Right  -PC     Pain Location knee  -PC     Pain Intervention(s) Medication (See MAR); Repositioned; Cold applied  -PC     Row Name 10/19/21 0930          Plan of Care Review    Plan of Care Reviewed With patient  -PC     Progress improving  -PC     Outcome Summary pt did very well this morning, able to walk 200 ft with Rwx in jimenez with good performance of exercises and ROM, pt is appropriate to go home today with assist and out pt PT, pt went up and down 4 stairs with min assist, will have assist at home  -PC     Row Name 10/19/21 0930          Positioning and Restraints    Pre-Treatment Position in bed  -PC     Post Treatment Position chair  -PC     In Chair reclined; call light within reach; encouraged to call for assist; exit alarm on  -PC           User Key  (r) = Recorded By, (t) = Taken By, (c) = Cosigned By    Initials Name Provider Type    PC Isabela Schneider, PT Physical Therapist               Outcome Measures     Row Name 10/19/21 0932          How much help from another person  do you currently need...    Turning from your back to your side while in flat bed without using bedrails? 4  -PC     Moving from lying on back to sitting on the side of a flat bed without bedrails? 4  -PC     Moving to and from a bed to a chair (including a wheelchair)? 4  -PC     Standing up from a chair using your arms (e.g., wheelchair, bedside chair)? 4  -PC     Climbing 3-5 steps with a railing? 3  -PC     To walk in hospital room? 4  -PC     AM-PAC 6 Clicks Score (PT) 23  -PC           User Key  (r) = Recorded By, (t) = Taken By, (c) = Cosigned By    Initials Name Provider Type    PC Isabela Schneider PT Physical Therapist                             Physical Therapy Education                 Title: PT OT SLP Therapies (Done)     Topic: Physical Therapy (Done)     Point: Mobility training (Done)     Learning Progress Summary           Patient Acceptance, E,D, DU by  at 10/19/2021 0932    Acceptance, E,D, NR by  at 10/18/2021 1324                   Point: Home exercise program (Done)     Learning Progress Summary           Patient Acceptance, E,D, DU by  at 10/19/2021 0932    Acceptance, E,D, NR by  at 10/18/2021 1324                   Point: Body mechanics (Done)     Learning Progress Summary           Patient Acceptance, E,D, DU by  at 10/19/2021 0932    Acceptance, E,D, NR by  at 10/18/2021 1324                   Point: Precautions (Done)     Learning Progress Summary           Patient Acceptance, E,D, DU by  at 10/19/2021 0932    Acceptance, E,D, NR by  at 10/18/2021 1324                               User Key     Initials Effective Dates Name Provider Type Discipline     06/16/21 -  Isabela Schneider PT Physical Therapist PT              PT Recommendation and Plan  Planned Therapy Interventions (PT): bed mobility training, gait training, transfer training, ROM (range of motion), stair training, strengthening  Plan of Care Reviewed With: patient  Progress: improving  Outcome Summary: pt did  very well this morning, able to walk 200 ft with Rwx in jimenez with good performance of exercises and ROM, pt is appropriate to go home today with assist and out pt PT, pt went up and down 4 stairs with min assist, will have assist at home     Time Calculation:    PT Charges     Row Name 10/19/21 0933             Time Calculation    Start Time 0830  -PC      Stop Time 0856  -PC      Time Calculation (min) 26 min  -PC      PT Received On 10/19/21  -PC            User Key  (r) = Recorded By, (t) = Taken By, (c) = Cosigned By    Initials Name Provider Type    PC Isabela Schneider, PT Physical Therapist              Therapy Charges for Today     Code Description Service Date Service Provider Modifiers Qty    44296217510 HC PT THER PROC EA 15 MIN 10/18/2021 Isabela Schneider, PT GP 1    91045145209 HC PT EVAL LOW COMPLEXITY 2 10/18/2021 Isabela Schneider, PT GP 1    28413556374 HC PT THER PROC EA 15 MIN 10/19/2021 Isabela Schneider, PT GP 1    30245111274 HC GAIT TRAINING EA 15 MIN 10/19/2021 Isabela Schneider, PT GP 1          PT G-Codes  Outcome Measure Options: AM-PAC 6 Clicks Basic Mobility (PT)  AM-PAC 6 Clicks Score (PT): 23    Isabela Schneider PT  10/19/2021

## 2021-10-20 ENCOUNTER — HOME CARE VISIT (OUTPATIENT)
Dept: HOME HEALTH SERVICES | Facility: HOME HEALTHCARE | Age: 71
End: 2021-10-20

## 2021-10-20 VITALS
DIASTOLIC BLOOD PRESSURE: 64 MMHG | SYSTOLIC BLOOD PRESSURE: 138 MMHG | RESPIRATION RATE: 18 BRPM | HEART RATE: 75 BPM | OXYGEN SATURATION: 99 % | TEMPERATURE: 97.3 F

## 2021-10-20 PROCEDURE — G0151 HHCP-SERV OF PT,EA 15 MIN: HCPCS

## 2021-10-20 NOTE — HOME HEALTH
Physical Therapy Evaluation   Diagnosis:  right TKA  Surgical Procedure and date: right TKA 10/18/2021  Pertinent Medical History:  see epic    Prior level of function:   Ambulation: independent   Activities of daily living: independent   Instrumental activities of daily living: independent   Driving:  drives   Other/ Hobbies/Work:    Edema:  moderate edema right knee and lower leg   Incision:  VIVEK dressing was saturated from the bleeding of drain.  Patient had spoken with Salas at Dr. Gerard's office and was told to have me change dressing.  Removed VIVEK and applied an optifoam dressing.  No drainage noted from knee incision.    Home/ social environment: lives with  in home with steps to enter/ exit and steps within home    Goal for Therapy:  to get mobility back and be normal again  Plan for next visit:  review supine and seated exercise, practice steps if desired

## 2021-10-20 NOTE — CASE MANAGEMENT/SOCIAL WORK
Case Management Discharge Note      Final Note: Home with [facility].         Selected Continued Care - Discharged on 10/19/2021 Admission date: 10/18/2021 - Discharge disposition: Home-Health Care Svc    Destination    No services have been selected for the patient.              Durable Medical Equipment    No services have been selected for the patient.              Dialysis/Infusion    No services have been selected for the patient.              Home Medical Care Coordination complete.    Service Provider Selected Services Address Phone Fax Patient Preferred    [facility]  Home Health Services [address] [address] [phone] --          Therapy    No services have been selected for the patient.              Community Resources    No services have been selected for the patient.              Community & DME    No services have been selected for the patient.                       Final Discharge Disposition Code: 06 - home with home health care

## 2021-10-22 ENCOUNTER — HOME CARE VISIT (OUTPATIENT)
Dept: HOME HEALTH SERVICES | Facility: HOME HEALTHCARE | Age: 71
End: 2021-10-22

## 2021-10-22 VITALS
RESPIRATION RATE: 18 BRPM | TEMPERATURE: 97.5 F | OXYGEN SATURATION: 98 % | DIASTOLIC BLOOD PRESSURE: 68 MMHG | HEART RATE: 75 BPM | SYSTOLIC BLOOD PRESSURE: 132 MMHG

## 2021-10-22 PROCEDURE — G0151 HHCP-SERV OF PT,EA 15 MIN: HCPCS

## 2021-10-22 NOTE — HOME HEALTH
Plan for next visit: add standing exercises, walk with walking stick    Subjective:  As long as I take my pain medication I'm fine.    Falls since last visit:  none  Home/Social Environment: lives with  in home with steps to enter/ exit and steps to second floor

## 2021-10-25 ENCOUNTER — HOME CARE VISIT (OUTPATIENT)
Dept: HOME HEALTH SERVICES | Facility: HOME HEALTHCARE | Age: 71
End: 2021-10-25

## 2021-10-25 VITALS
OXYGEN SATURATION: 98 % | SYSTOLIC BLOOD PRESSURE: 132 MMHG | HEART RATE: 74 BPM | DIASTOLIC BLOOD PRESSURE: 68 MMHG | TEMPERATURE: 97.4 F | RESPIRATION RATE: 18 BRPM

## 2021-10-25 PROCEDURE — G0151 HHCP-SERV OF PT,EA 15 MIN: HCPCS

## 2021-10-25 NOTE — HOME HEALTH
Plan for next visit: add heel raise/toe raise and mini squats, walk with walking stick    Subjective:  I have been walking without the walker at times.  I need it for this step up.    Falls since last visit:  none  Home/Social Environment: lives with  in home with steps to enter/ exit and steps to second floor

## 2021-10-27 ENCOUNTER — HOME CARE VISIT (OUTPATIENT)
Dept: HOME HEALTH SERVICES | Facility: HOME HEALTHCARE | Age: 71
End: 2021-10-27

## 2021-10-27 ENCOUNTER — TELEPHONE (OUTPATIENT)
Dept: ORTHOPEDIC SURGERY | Facility: CLINIC | Age: 71
End: 2021-10-27

## 2021-10-27 VITALS
DIASTOLIC BLOOD PRESSURE: 69 MMHG | TEMPERATURE: 97.4 F | RESPIRATION RATE: 18 BRPM | HEART RATE: 68 BPM | OXYGEN SATURATION: 98 % | SYSTOLIC BLOOD PRESSURE: 151 MMHG

## 2021-10-27 DIAGNOSIS — Z96.651 S/P TKR (TOTAL KNEE REPLACEMENT), RIGHT: Primary | ICD-10-CM

## 2021-10-27 PROCEDURE — G0157 HHC PT ASSISTANT EA 15: HCPCS

## 2021-10-27 RX ORDER — TRAMADOL HYDROCHLORIDE 50 MG/1
50 TABLET ORAL EVERY 4 HOURS PRN
Qty: 30 TABLET | Refills: 0 | Status: SHIPPED | OUTPATIENT
Start: 2021-10-27

## 2021-10-27 NOTE — TELEPHONE ENCOUNTER
I also called her in some tramadol which she can take for severe breakthrough pain.  This should not cause her nausea/dizziness back to hydrocodone days.  Please inform the patient that she can take this along with the Tylenol that she recommended.

## 2021-10-27 NOTE — TELEPHONE ENCOUNTER
I cannot recommend her to take CBD supplements. This is something she should discuss with her PCP.

## 2021-10-27 NOTE — HOME HEALTH
SUBJECTIVE: Patient reports that she is not taking narcotics anymore, has completely switched to tylenol. Is struggling some to sleep now, and has had a little bit of nausea but otherwise doing ok.     EDEMA: mod non-pitting    WOUNDS: Optifoam in place, no sings of outer drainage, calf soft and non-tender    SIGNS/SYMPTOMS OF INFECTION: No    FALLS SINCE LAST VISIT: No

## 2021-10-27 NOTE — TELEPHONE ENCOUNTER
No hydrocodone apap since Monday made her feel nauseous/dizzy - patient started taking 3 tylenol 325 mg q 4 hours and that was not hel ping. I recommend for patient take 500 mg tylenol and to follow the directions on the bottle and to not take more than 4,000 mg of tylenol daily. Patient voiced understanding and will try that. I also encouraged her to continue to ice and elevate the knee

## 2021-10-27 NOTE — TELEPHONE ENCOUNTER
.    Caller: Reina Zaman     Relationship: [unfilled] SELF    Best call back number: [phone]    What is your medical concern? QUESTIONS ABOUT PAIN MED  How long has this issue been going on?    Is your provider already aware of this issue? NO    PATIENT HAD TOTAL KNEE SURGERY 10/18 AND HAS SOME QUESTIONS/CONCERNS ABOUT HER PAIN MEDICATION AND WOULD LIKE A CALL BACK

## 2021-10-27 NOTE — TELEPHONE ENCOUNTER
We are in surgery all day today.  Can you please call and find out what questions that they have about pain medicine?

## 2021-10-29 ENCOUNTER — HOME CARE VISIT (OUTPATIENT)
Dept: HOME HEALTH SERVICES | Facility: HOME HEALTHCARE | Age: 71
End: 2021-10-29

## 2021-10-29 VITALS
HEART RATE: 70 BPM | SYSTOLIC BLOOD PRESSURE: 116 MMHG | DIASTOLIC BLOOD PRESSURE: 72 MMHG | RESPIRATION RATE: 18 BRPM | OXYGEN SATURATION: 98 % | TEMPERATURE: 97.3 F

## 2021-10-29 PROCEDURE — G0180 MD CERTIFICATION HHA PATIENT: HCPCS | Performed by: ORTHOPAEDIC SURGERY

## 2021-10-29 PROCEDURE — G0151 HHCP-SERV OF PT,EA 15 MIN: HCPCS

## 2021-10-29 NOTE — HOME HEALTH
Plan for next visit: review everything and discharge to out patient therapy    Subjective:  I'm not sleeping past 6 hours.  That is my main complaint.    Falls since last visit:  none  Home/Social Environment: lives with  in home with steps to enter/ exit and steps to second floor

## 2021-11-03 ENCOUNTER — HOME CARE VISIT (OUTPATIENT)
Dept: HOME HEALTH SERVICES | Facility: HOME HEALTHCARE | Age: 71
End: 2021-11-03

## 2021-11-03 VITALS
HEART RATE: 76 BPM | DIASTOLIC BLOOD PRESSURE: 76 MMHG | SYSTOLIC BLOOD PRESSURE: 118 MMHG | TEMPERATURE: 97.8 F | RESPIRATION RATE: 18 BRPM | OXYGEN SATURATION: 98 %

## 2021-11-03 PROCEDURE — G0151 HHCP-SERV OF PT,EA 15 MIN: HCPCS

## 2021-11-03 NOTE — HOME HEALTH
Subjective:  I'm up on feet a lot more.  So I'm more painful.      Falls since last visit:  none  Home/Social Environment: lives with  in home with steps to enter/ exit and steps to second floor

## 2021-11-04 ENCOUNTER — OFFICE VISIT (OUTPATIENT)
Dept: ORTHOPEDIC SURGERY | Facility: CLINIC | Age: 71
End: 2021-11-04

## 2021-11-04 VITALS — HEIGHT: 62 IN | TEMPERATURE: 97.1 F | BODY MASS INDEX: 23.19 KG/M2 | WEIGHT: 126 LBS

## 2021-11-04 DIAGNOSIS — Z96.651 S/P TKR (TOTAL KNEE REPLACEMENT), RIGHT: Primary | ICD-10-CM

## 2021-11-04 PROCEDURE — 99024 POSTOP FOLLOW-UP VISIT: CPT | Performed by: NURSE PRACTITIONER

## 2021-11-04 PROCEDURE — 73562 X-RAY EXAM OF KNEE 3: CPT | Performed by: NURSE PRACTITIONER

## 2021-11-04 NOTE — PROGRESS NOTES
Reina Zaman : 1950 MRN: [id number] DATE: 2021    DIAGNOSIS: 2 week follow up right total knee      SUBJECTIVE:Patient returns today for 2 week follow up of right total knee replacement. Patient reports doing well with no unusual complaints. Appears to be progressing appropriately. Patient did say that she slipped fell in the shower this morning, and her knee is sore.     OBJECTIVE:   Exam: Right knee -  The incision is healing appropriately. No sign of infection. Range of motion is progressing as expected. The calf is soft and nontender with a negative Homans sign.    X-ray -images 3 views right knee (AP, lateral, sunrise views) were ordered and reviewed for evaluation of right knee pain status post fall and right total knee replacement.  Images show patient has proper alignment of her knee implants with no sign of fracture or loosening.  There is no change with in comparison to her postop x-rays.    ASSESSMENT: 2 week status post right knee replacement.    PLAN: 1) VIVEK infusion dressings removed and steri strips applied   2) Order given for PT   3) Discontinue TOMASA hose   4) Continue ice PRN   5) aspirin 81 mg orally every day for 1 month   6) Follow up in 6 weeks with repeat Xrays of right knee (3views)    SHRUTI Whelan  2021

## 2021-11-30 ENCOUNTER — TELEPHONE (OUTPATIENT)
Dept: ORTHOPEDIC SURGERY | Facility: CLINIC | Age: 71
End: 2021-11-30

## 2021-11-30 NOTE — TELEPHONE ENCOUNTER
Caller: AMBROSE  Relationship to Patient: SELF    Phone Number:  [phone]  Reason for Call: PT CALLED STATING THAT SHE HAS A NIECE THAT IS IN THE END STAGE OF HER LIFE THAT LIVES OUT OF STATE AND IS NEEDING TO VISIT HER. SHE STATES THAT SHE WAS ADVISED NO TRAVEL A MINIMUM OF 3 MONTHS POST SX. PT STATES THAT SHE HAS A PLAN IN PLACE THAT SHOULD ALLOW HER TO BE SAFE WHILE TRAVELING AND WOULD LIKE ADVICE FROM DR. BAUER AND/OR YURI WALKER ABOUT THE SITUATION. SHE STATES THAT SHE WILL NOT BE LEAVING FOR ANOTHER WEEK OR TWO. PLEASE ADVISE PT AT ABOVE PHONE NUMBER.

## 2021-12-21 ENCOUNTER — OFFICE VISIT (OUTPATIENT)
Dept: ORTHOPEDIC SURGERY | Facility: CLINIC | Age: 71
End: 2021-12-21

## 2021-12-21 DIAGNOSIS — M25.561 RIGHT KNEE PAIN, UNSPECIFIED CHRONICITY: Primary | ICD-10-CM

## 2021-12-21 DIAGNOSIS — Z96.651 STATUS POST RIGHT KNEE REPLACEMENT: ICD-10-CM

## 2021-12-21 PROCEDURE — 99024 POSTOP FOLLOW-UP VISIT: CPT | Performed by: ORTHOPAEDIC SURGERY

## 2021-12-21 PROCEDURE — 73562 X-RAY EXAM OF KNEE 3: CPT | Performed by: ORTHOPAEDIC SURGERY

## 2021-12-21 NOTE — PROGRESS NOTES
Reina Zaman : 1950 MRN: [id number] DATE: 2021    DIAGNOSIS: 8 week follow up right total knee      SUBJECTIVE:Patient returns today for 8 week follow up of right total knee replacement. Patient reports doing well with no unusual complaints. Appears to be progressing appropriately.    OBJECTIVE:   Exam:. The incision is well healed. No sign of infection. Range of motion is measured at 2 to 120. The calf is soft and nontender with a negative Homans sign. Strength is progressing and the patient is ambulating appropriately.    DIAGNOSTIC STUDIES  Xrays: 3 views of the right knee (AP, lateral, and sunrise) were ordered and reviewed for evaluation of recent knee replacement. They demonstrate a well positioned, well aligned knee replacement without complicating factors noted. In comparison with previous films there has been no change.    ASSESSMENT: 8 week status post right knee replacement.    PLAN: 1) Continue with PT exercises as prescribed   2) Follow up in 10 months    Jason Gerard MD  2021

## 2022-01-26 ENCOUNTER — TELEPHONE (OUTPATIENT)
Dept: ORTHOPEDIC SURGERY | Facility: CLINIC | Age: 72
End: 2022-01-26

## 2022-01-26 NOTE — TELEPHONE ENCOUNTER
From: Angeline Donahue On: 11/03/2021 09:37 AM   To: Jason Gerard MD, Salas Moore APRN   Priority: Routine   Routing Comments:   Patient discharged from [facility] to out patient PT.

## 2022-02-14 ENCOUNTER — TELEPHONE (OUTPATIENT)
Dept: ORTHOPEDIC SURGERY | Facility: CLINIC | Age: 72
End: 2022-02-14

## 2022-02-14 NOTE — TELEPHONE ENCOUNTER
Pt had TKA in 10/21 pt has dental appt with deep cleaning procedure 02/22 needs antibiotics sent to [facility]

## 2022-02-15 RX ORDER — CEPHALEXIN 500 MG/1
CAPSULE ORAL
Qty: 4 CAPSULE | Refills: 3 | Status: SHIPPED | OUTPATIENT
Start: 2022-02-15

## 2022-08-01 RX ORDER — PANTOPRAZOLE SODIUM 40 MG/1
TABLET, DELAYED RELEASE ORAL
Qty: 90 TABLET | Refills: 2 | OUTPATIENT
Start: 2022-08-01

## 2022-08-03 NOTE — TELEPHONE ENCOUNTER
Caller: Reina Zaman    Relationship: Self    Best call back number: [phone]  Requested Prescriptions:   Requested Prescriptions     Pending Prescriptions Disp Refills   • pantoprazole (PROTONIX) 40 MG EC tablet 90 tablet 2     Sig: Take 1 tablet by mouth Daily.        Pharmacy where request should be sent:    [id number]        Does the patient have less than a 3 day supply:  [] Yes  [x] No    Murray Cristina Rep   08/03/22 13:30 EDT

## 2022-08-04 RX ORDER — PANTOPRAZOLE SODIUM 40 MG/1
40 TABLET, DELAYED RELEASE ORAL DAILY
Qty: 30 TABLET | Refills: 0 | Status: SHIPPED | OUTPATIENT
Start: 2022-08-04 | End: 2022-08-12 | Stop reason: DRUGHIGH

## 2022-08-12 ENCOUNTER — OFFICE VISIT (OUTPATIENT)
Dept: GASTROENTEROLOGY | Facility: CLINIC | Age: 72
End: 2022-08-12

## 2022-08-12 VITALS
BODY MASS INDEX: 24.18 KG/M2 | WEIGHT: 131.4 LBS | DIASTOLIC BLOOD PRESSURE: 74 MMHG | TEMPERATURE: 95.6 F | SYSTOLIC BLOOD PRESSURE: 117 MMHG | HEIGHT: 62 IN | HEART RATE: 58 BPM

## 2022-08-12 DIAGNOSIS — K21.9 GASTROESOPHAGEAL REFLUX DISEASE, UNSPECIFIED WHETHER ESOPHAGITIS PRESENT: Primary | ICD-10-CM

## 2022-08-12 DIAGNOSIS — Z80.0 FH: COLON CANCER: ICD-10-CM

## 2022-08-12 DIAGNOSIS — Z86.010 PERSONAL HISTORY OF COLONIC POLYPS: ICD-10-CM

## 2022-08-12 PROCEDURE — 99213 OFFICE O/P EST LOW 20 MIN: CPT | Performed by: PHYSICIAN ASSISTANT

## 2022-08-12 RX ORDER — PANTOPRAZOLE SODIUM 40 MG/1
40 TABLET, DELAYED RELEASE ORAL DAILY
Qty: 90 TABLET | Refills: 3 | Status: CANCELLED | OUTPATIENT
Start: 2022-08-12

## 2022-08-12 RX ORDER — PANTOPRAZOLE SODIUM 20 MG/1
20 TABLET, DELAYED RELEASE ORAL DAILY
Qty: 90 TABLET | Refills: 1 | Status: SHIPPED | OUTPATIENT
Start: 2022-08-12

## 2022-08-12 RX ORDER — FAMOTIDINE 40 MG/1
40 TABLET, FILM COATED ORAL EVERY EVENING
Qty: 90 TABLET | Refills: 3 | Status: SHIPPED | OUTPATIENT
Start: 2022-08-12

## 2022-08-12 NOTE — PROGRESS NOTES
"Chief Complaint  Heartburn    Subjective          History of Present Illness    Reina Zaman is a  72 y.o. female presents for follow-up visit for GERD.  She is a patient of Dr. Laura last seen for EGD and colonoscopy on 7/13/2021.  She is new to me.    She takes pantoprazole 20 mg in the morning and Pepcid 40 mg in the evening for her GERD.  This controls her symptoms well only occasional flares.  Previously she was having trouble with swallowing but denies this currently.  She denies nausea, vomiting, melena hematochezia, dysphagia, diarrhea, constipation, abnormal weight loss.    7/13/2021 colonoscopy showed 2 small polyps-hyperplastic, nonbleeding internal hemorrhoids, otherwise normal.  Recall recommended in 5 years.    EGD at the same time showed small hiatal hernia, low-grade Schatzki's ring-balloon dilated to 18 mm, and moderate gastritis.  Gastric biopsies negative for H. pylori.    Family history of CRC and personal history of colon polyps.    Objective   Vital Signs:   /74   Pulse 58   Temp 95.6 °F (35.3 °C)   Ht 157.5 cm (62\")   Wt 59.6 kg (131 lb 6.4 oz)   BMI 24.03 kg/m²       Physical Exam  Vitals reviewed.   Constitutional:       General: She is awake. She is not in acute distress.     Appearance: Normal appearance. She is well-developed and well-groomed.   HENT:      Head: Normocephalic.   Pulmonary:      Effort: Pulmonary effort is normal. No respiratory distress.   Skin:     Coloration: Skin is not pale.   Neurological:      Mental Status: She is alert and oriented to person, place, and time.      Gait: Gait is intact.   Psychiatric:         Mood and Affect: Mood and affect normal.         Speech: Speech normal.         Behavior: Behavior is cooperative.         Judgment: Judgment normal.          Result Review :             Assessment and Plan    Diagnoses and all orders for this visit:    1. Gastroesophageal reflux disease, unspecified whether esophagitis present (Primary)    2. " Personal history of colonic polyps    3. FH: colon cancer    Other orders  -     pantoprazole (Protonix) 20 MG EC tablet; Take 1 tablet by mouth Daily.  Dispense: 90 tablet; Refill: 1  -     famotidine (PEPCID) 40 MG tablet; Take 1 tablet by mouth Every Evening.  Dispense: 90 tablet; Refill: 3    She is doing well on current therapy.  She will continue Protonix in the morning and Pepcid in the evening.  Reiterated GERD and lifestyle changes.  She could try to back off on dosing and take less if she can.      Follow Up   Return in about 1 year (around 8/12/2023).    Dragon dictation used throughout this note.     Ivis Ramirez PA-C

## 2022-10-20 ENCOUNTER — OFFICE VISIT (OUTPATIENT)
Dept: ORTHOPEDIC SURGERY | Facility: CLINIC | Age: 72
End: 2022-10-20

## 2022-10-20 VITALS — HEIGHT: 62 IN | BODY MASS INDEX: 23.55 KG/M2 | RESPIRATION RATE: 16 BRPM | TEMPERATURE: 97.2 F | WEIGHT: 128 LBS

## 2022-10-20 DIAGNOSIS — Z96.651 STATUS POST RIGHT KNEE REPLACEMENT: Primary | ICD-10-CM

## 2022-10-20 PROCEDURE — 73562 X-RAY EXAM OF KNEE 3: CPT | Performed by: NURSE PRACTITIONER

## 2022-10-20 PROCEDURE — 99212 OFFICE O/P EST SF 10 MIN: CPT | Performed by: NURSE PRACTITIONER

## 2022-10-20 RX ORDER — ALBUTEROL SULFATE 90 UG/1
AEROSOL, METERED RESPIRATORY (INHALATION)
COMMUNITY
Start: 2022-10-17

## 2022-10-20 RX ORDER — CHLORAL HYDRATE 500 MG
CAPSULE ORAL
COMMUNITY

## 2022-10-20 NOTE — PROGRESS NOTES
"Reina Zaman : 1950 MRN: [id number] DATE: 10/20/2022    DIAGNOSIS: Annual follow up right total knee      SUBJECTIVE:Patient returns today for a one year year follow up of right total knee replacement. Patient reports doing well with no unusual complaints. Denies any limitations due to the knee.    OBJECTIVE:    Temp 97.2 °F (36.2 °C) (Temporal)   Resp 16   Ht 157.5 cm (62.01\")   Wt 58.1 kg (128 lb)   BMI 23.41 kg/m²   Family History   Problem Relation Age of Onset   • Cancer Maternal Grandmother         colon   • Colon cancer Maternal Grandmother    • Colon polyps Maternal Grandmother    • Cancer Other         two cousins colon cancer   • Heart disease Mother    • Cancer Mother         brain   • Alcohol abuse Father    • Colon polyps Sister    • Malig Hyperthermia Neg Hx      Past Medical History:   Diagnosis Date   • Arthritis     osteo   • Asthma    • Atrial fibrillation (HCC)    • Colon polyp    • Endometriosis    • GERD (gastroesophageal reflux disease)    • Hyperlipidemia    • MVP (mitral valve prolapse)    • Osteoporosis    • Squamous cell carcinoma     skin squamous     Past Surgical History:   Procedure Laterality Date   • APPENDECTOMY      removed with colon surgery   • COLON SURGERY  1983    bowel blockage   • COLONOSCOPY  2015   • COLONOSCOPY N/A 3/20/2018    Procedure: COLONOSCOPY INTO CECUM AND TI WITH HOT SNARE POLYPECTOMIES, SALINE LIFT;  Surgeon: Lidia Laura MD;  Location: [facility] ENDOSCOPY;  Service: Gastroenterology   • COLONOSCOPY N/A 2021    Procedure: COLONOSCOPY TO CECUM WITH COLD SNARE POLYPECTOMIES;  Surgeon: Lidia Laura MD;  Location: [facility] ENDOSCOPY;  Service: Gastroenterology;  Laterality: N/A;  PRE OP - PERS H/O POLYPS  POST OP - COLON POLYPS, HEMORRHOIDS   • ENDOSCOPY N/A 2021    Procedure: ESOPHAGOGASTRODUODENOSCOPY WITH BIOPSIES AND BALLOON DILATION 15, 16.5, 18;  Surgeon: Lidia Laura MD;  Location: [facility] ENDOSCOPY;  Service: " Gastroenterology;  Laterality: N/A;  PRE OP - GERD, DYSPHAGIA  POST OP- GASTRITIS, SM HIATAL HERNIA, SCHOTSKIES RING   • FULGURATION ENDOMETRIOSIS     • HYSTERECTOMY     • SKIN CANCER EXCISION     • TOTAL KNEE ARTHROPLASTY Right 10/18/2021    Procedure: TOTAL KNEE ARTHROPLASTY;  Surgeon: Jason Gerard MD;  Location: [facility];  Service: Orthopedics;  Laterality: Right;     Social History     Socioeconomic History   • Marital status:    Tobacco Use   • Smoking status: Former     Packs/day: 0.25     Years: 4.00     Pack years: 1.00     Types: Cigarettes     Quit date: 1975     Years since quittin.8   • Smokeless tobacco: Never   • Tobacco comments:     Smoked during college and a few years after   Vaping Use   • Vaping Use: Never used   Substance and Sexual Activity   • Alcohol use: Yes     Alcohol/week: 7.0 standard drinks     Types: 7 Glasses of wine per week     Comment: Red wine with dinner   • Drug use: No     Comment: history college   • Sexual activity: Yes     Partners: Male     Birth control/protection: Post-menopausal     Review of Systems: 14 point review of systems performed, all systems negative     Exam:. The incision is well healed. Range of motion is measured at 0 to 120. The calf is soft and nontender with a negative Homans sign. Alignment is neutral. Good quad strength. There is no evidence of varus/valgus or flexion instability. No effusion. Intact to light touch with palpable distal pulses.     DIAGNOSTIC STUDIES  Xrays: 3 views(AP bilateral knees, lateral right, and sunrise bilateral knees) were ordered and reviewed for evaluation of right knee replacement. They demonstrate a well positioned, well aligned knee replacement without complicating factors noted. In comparison with previous films there has been no change.    ASSESSMENT: Annual follow up right knee replacement.    PLAN:  Continue activities as tolerated    Follow up PRN    Salas Moore, SHRUTI  10/20/2022

## 2023-08-14 ENCOUNTER — OFFICE VISIT (OUTPATIENT)
Dept: GASTROENTEROLOGY | Facility: CLINIC | Age: 73
End: 2023-08-14
Payer: MEDICARE

## 2023-08-14 VITALS
WEIGHT: 133 LBS | SYSTOLIC BLOOD PRESSURE: 135 MMHG | OXYGEN SATURATION: 95 % | BODY MASS INDEX: 24.48 KG/M2 | HEIGHT: 62 IN | TEMPERATURE: 97.5 F | HEART RATE: 66 BPM | DIASTOLIC BLOOD PRESSURE: 84 MMHG

## 2023-08-14 DIAGNOSIS — Z80.0 FH: COLON CANCER: ICD-10-CM

## 2023-08-14 DIAGNOSIS — K21.9 GASTROESOPHAGEAL REFLUX DISEASE, UNSPECIFIED WHETHER ESOPHAGITIS PRESENT: Primary | ICD-10-CM

## 2023-08-14 DIAGNOSIS — Z86.010 PERSONAL HISTORY OF COLONIC POLYPS: ICD-10-CM

## 2023-08-14 DIAGNOSIS — T17.308A CHOKING, INITIAL ENCOUNTER: ICD-10-CM

## 2023-08-14 PROCEDURE — 1159F MED LIST DOCD IN RCRD: CPT | Performed by: PHYSICIAN ASSISTANT

## 2023-08-14 PROCEDURE — 99213 OFFICE O/P EST LOW 20 MIN: CPT | Performed by: PHYSICIAN ASSISTANT

## 2023-08-14 PROCEDURE — 1160F RVW MEDS BY RX/DR IN RCRD: CPT | Performed by: PHYSICIAN ASSISTANT

## 2023-08-14 RX ORDER — KETOROLAC TROMETHAMINE 5 MG/ML
SOLUTION OPHTHALMIC
COMMUNITY
Start: 2023-07-14

## 2023-08-14 RX ORDER — ASCORBIC ACID 500 MG
500 TABLET ORAL DAILY
COMMUNITY

## 2023-08-14 RX ORDER — UBIDECARENONE 50 MG
CAPSULE ORAL DAILY
COMMUNITY

## 2023-08-14 RX ORDER — PREDNISOLONE ACETATE 10 MG/ML
SUSPENSION/ DROPS OPHTHALMIC
COMMUNITY
Start: 2023-07-14

## 2023-08-14 RX ORDER — PANTOPRAZOLE SODIUM 20 MG/1
20 TABLET, DELAYED RELEASE ORAL DAILY
Qty: 90 TABLET | Refills: 1 | Status: SHIPPED | OUTPATIENT
Start: 2023-08-14

## 2023-08-14 NOTE — PROGRESS NOTES
"Chief Complaint  Heartburn, Nausea, and Difficulty Swallowing    Subjective          History of Present Illness    Reina Zaman is a  73 y.o. female presents for follow-up visit for GERD. She is a patient of Dr. Laura last seen by me on 8/12/2022.    She weaned off her pantoprazole after last visit.  She took it intermittently for a while.  Last dose about a month ago.  She still has Pepcid 40 mg but forgets to take it as she has a hard time remembering to take it before meals.  She reports doing fairly well but does admit to upset stomach, dry cough that she thinks might be related to allergies, belching, and episodes of choking.  Choking episodes are occurring intermittently for the last couple months, feels like liquids and sometimes food is going down her windpipe.  Recently diagnosed with COVID 1 month ago.  She denies vomiting, melena hematochezia, constipation, diarrhea, abnormal weight loss.    From 8/12/2022 note:  7/13/2021 colonoscopy showed 2 small polyps-hyperplastic, nonbleeding internal hemorrhoids, otherwise normal.  Recall recommended in 5 years.     EGD at the same time showed small hiatal hernia, low-grade Schatzki's ring-balloon dilated to 18 mm, and moderate gastritis.  Gastric biopsies negative for H. pylori.     Family history of CRC and personal history of colon polyps.    Objective   Vital Signs:   /84 (BP Location: Left arm, Patient Position: Sitting, Cuff Size: Adult)   Pulse 66   Temp 97.5 øF (36.4 øC) (Temporal)   Ht 157.5 cm (62.01\")   Wt 60.3 kg (133 lb)   SpO2 95%   BMI 24.32 kg/mý       Physical Exam  Vitals reviewed.   Constitutional:       General: She is awake. She is not in acute distress.     Appearance: Normal appearance. She is well-developed and well-groomed.   HENT:      Head: Normocephalic.   Pulmonary:      Effort: Pulmonary effort is normal. No respiratory distress.   Skin:     Coloration: Skin is not pale.   Neurological:      Mental Status: She is alert " and oriented to person, place, and time.      Gait: Gait is intact.   Psychiatric:         Mood and Affect: Mood and affect normal.         Speech: Speech normal.         Behavior: Behavior is cooperative.         Judgment: Judgment normal.        Result Review :             Assessment and Plan    Diagnoses and all orders for this visit:    1. Gastroesophageal reflux disease, unspecified whether esophagitis present (Primary)    2. Personal history of colonic polyps    3. FH: colon cancer    4. Choking, initial encounter  -     FL Video Swallow With Speech Single Contrast    Other orders  -     pantoprazole (Protonix) 20 MG EC tablet; Take 1 tablet by mouth Daily.  Dispense: 90 tablet; Refill: 1    Discussed that her symptoms of choking and cough may be related to GERD.  Certainly I think her upset stomach is related to dyspepsia but this started with COVID infection.  Recommend restarting pantoprazole 20 mg daily for the next 2 weeks and follow her symptoms.  If all symptoms resolve, then likely they are all reflux related.  Would recommend taking pantoprazole least every other day.  Also discussed recommendation to take Pepcid every day opposite pantoprazole.  She may only need this if she can take it regularly.  We will go ahead and order video swallow for choking episodes to ensure no evidence of aspiration or oropharyngeal dysphagia.  We will call her with results.  Follow-up in 1 year or based on testing results./Symptoms.    Recall colonoscopy 2026.    Follow Up   Return in about 1 year (around 8/14/2024), or if symptoms worsen or fail to improve.    Dragon dictation used throughout this note.     Ivis Ramirez PA-C

## 2023-09-07 ENCOUNTER — HOSPITAL ENCOUNTER (OUTPATIENT)
Dept: GENERAL RADIOLOGY | Facility: HOSPITAL | Age: 73
Discharge: HOME | End: 2023-09-07
Admitting: PHYSICIAN ASSISTANT
Payer: MEDICARE

## 2023-09-07 DIAGNOSIS — T17.308A CHOKING, INITIAL ENCOUNTER: Primary | ICD-10-CM

## 2023-09-07 PROCEDURE — 63710000001 BARIUM SULFATE 98 % RECONSTITUTED SUSPENSION: Performed by: PHYSICIAN ASSISTANT

## 2023-09-07 PROCEDURE — A9270 NON-COVERED ITEM OR SERVICE: HCPCS | Performed by: PHYSICIAN ASSISTANT

## 2023-09-07 PROCEDURE — 92611 MOTION FLUOROSCOPY/SWALLOW: CPT

## 2023-09-07 PROCEDURE — 74230 X-RAY XM SWLNG FUNCJ C+: CPT

## 2023-09-07 PROCEDURE — 63710000001 BARIUM SULFATE 40 % RECONSTITUTED SUSPENSION: Performed by: PHYSICIAN ASSISTANT

## 2023-09-07 PROCEDURE — 63710000001 BARIUM SULFATE 40 % SUSPENSION: Performed by: PHYSICIAN ASSISTANT

## 2023-09-07 RX ADMIN — BARIUM SULFATE 135 ML: 980 POWDER, FOR SUSPENSION ORAL at 14:26

## 2023-09-07 RX ADMIN — BARIUM SULFATE 55 ML: 0.81 POWDER, FOR SUSPENSION ORAL at 14:26

## 2023-09-07 RX ADMIN — BARIUM SULFATE 50 ML: 400 SUSPENSION ORAL at 14:26

## 2023-09-07 NOTE — MBS/VFSS/FEES
Outpatient Speech Language Pathology   Adult Swallow Initial Evaluation  [facility]     Patient Name: Reina Zaman  : 1950  MRN: [id number]  Today's Date: 2023         Visit Date: 2023   Patient Active Problem List   Diagnosis    FH: colon cancer    Adenomatous polyp of colon    Dysphagia    Globus sensation    Gastroesophageal reflux disease    NSAID long-term use    Primary osteoarthritis of right knee    S/P TKR (total knee replacement), right        Past Medical History:   Diagnosis Date    Arthritis     osteo    Asthma     Atrial fibrillation     Colon polyp     Endometriosis     GERD (gastroesophageal reflux disease)     Hyperlipidemia     MVP (mitral valve prolapse)     Osteoporosis     Squamous cell carcinoma     skin squamous        Past Surgical History:   Procedure Laterality Date    APPENDECTOMY      removed with colon surgery    COLON SURGERY      bowel blockage    COLONOSCOPY  2015    COLONOSCOPY N/A 3/20/2018    Procedure: COLONOSCOPY INTO CECUM AND TI WITH HOT SNARE POLYPECTOMIES, SALINE LIFT;  Surgeon: Lidia Laura MD;  Location: [facility] ENDOSCOPY;  Service: Gastroenterology    COLONOSCOPY N/A 2021    Procedure: COLONOSCOPY TO CECUM WITH COLD SNARE POLYPECTOMIES;  Surgeon: Lidia Laura MD;  Location: [facility] ENDOSCOPY;  Service: Gastroenterology;  Laterality: N/A;  PRE OP - PERS H/O POLYPS  POST OP - COLON POLYPS, HEMORRHOIDS    ENDOSCOPY N/A 2021    Procedure: ESOPHAGOGASTRODUODENOSCOPY WITH BIOPSIES AND BALLOON DILATION 15, 16.5, 18;  Surgeon: Lidia Laura MD;  Location: [facility] ENDOSCOPY;  Service: Gastroenterology;  Laterality: N/A;  PRE OP - GERD, DYSPHAGIA  POST OP- GASTRITIS, SM HIATAL HERNIA, SCHOTSKIES RING    FULGURATION ENDOMETRIOSIS      HYSTERECTOMY      SKIN CANCER EXCISION      TOTAL KNEE ARTHROPLASTY Right 10/18/2021    Procedure: TOTAL KNEE ARTHROPLASTY;  Surgeon: Jason Gerard MD;  Location: [facility] MAIN OR;  Service:  Orthopedics;  Laterality: Right;         Visit Dx:     ICD-10-CM ICD-9-CM   1. Choking, initial encounter  T17.308A 933.1            OP SLP Assessment/Plan - 09/07/23 1523          SLP Assessment    Functional Problems Swallowing  -SH    SLP Diagnosis WFL  -    Patient/caregiver participated in establishment of treatment plan and goals Yes  -    Patient would benefit from skilled therapy intervention No  -SH              User Key  (r) = Recorded By, (t) = Taken By, (c) = Cosigned By      Initials Name Provider Type     Lesly Mercedes MS CCC-SLP Speech and Language Pathologist                     SLP Adult Swallow Evaluation       Row Name 09/07/23 1500       Rehab Evaluation    Document Type evaluation  -    Patient Effort excellent  -       General Information    Patient Profile Reviewed yes  -    Pertinent History Of Current Problem Coughing with PO  -    Current Method of Nutrition regular textures;thin liquids  -    Precautions/Limitations, Vision WFL;for purposes of eval  -    Precautions/Limitations, Hearing WFL;for purposes of eval  -    Prior Level of Function-Communication WFL  -    Prior Level of Function-Swallowing esophageal concerns;other (see comments)  improvement noted after taking PPI  -    Plans/Goals Discussed with patient  -    Barriers to Rehab none identified  -    Patient's Goals for Discharge eat/drink without coughing/choking  -       Pain    Additional Documentation Pain Scale: FACES Pre/Post-Treatment (Group)  -       Pain Scale: FACES Pre/Post-Treatment    Pain: FACES Scale, Pretreatment 0-->no hurt  -       Oral Motor Structure and Function    Dentition Assessment natural, present and adequate  -       Oral Musculature and Cranial Nerve Assessment    Oral Motor General Assessment WFL  -       MBS/VFSS Interpretation    VFSS Summary Patient presents with functional oropharyngeal swallow. Prominent cricopharyngeus noted. Transient, shallow penetration  during the swallow with thins via cup and straw. No signficant pharyngeal residue post swallow. Mild base of tongue residue after spontaneous double swallow with puree. Trace upper esophageal backflow to pyriforms. Rotary mastication with soft solids and regular. Piece meal swallow demonstrated with peaches. Slow movement and brief retention of regular solids through cervical esophagus. Pt did cough in response to material present in upper esophagus. Tertiary contractions observed during esophageal scan.  -       SLP Communication to Radiology    Summary Statement Radiologist present: Dr. Velarde. No aspiration observed on this study. Functional oropharyngeal swallow demonstrated.  Slow movement and brief retention of regular solids through cervical esophagus. Patient produced a cough in response to material present in upper esophagus.  -       SLP Evaluation Clinical Impression    SLP Swallowing Diagnosis swallow WFL/no suspected pharyngeal impairment  -       Recommendations    Therapy Frequency (Swallow) evaluation only  -    SLP Diet Recommendation regular textures;thin liquids  -    Recommended Precautions and Strategies upright posture during/after eating  -    Oral Care Recommendations Oral Care BID/PRN  -    SLP Rec. for Method of Medication Administration meds whole;as tolerated  -    Anticipated Discharge Disposition (SLP) No further SLP services warranted  -              User Key  (r) = Recorded By, (t) = Taken By, (c) = Cosigned By      Initials Name Provider Type     Lesly Mercedes MS CCC-SLP Speech and Language Pathologist                                   OP SLP Education       Row Name 09/07/23 1523       Education    Barriers to Learning No barriers identified  -    Assessed Learning needs;Learning motivation  -    Learning Motivation Strong  -    Learning Method Explanation;Other (comment)  Reviewed images from VFSS with patient  -              User Key  (r) = Recorded  By, (t) = Taken By, (c) = Cosigned By      Initials Name Effective Dates     Lesly Mercedes, MS CCC-SLP 06/16/21 -                              Time Calculation:   SLP Start Time: 1400  Untimed Charges  58929-IE Motion Fluoro Eval Swallow Minutes: 60  Total Minutes  Untimed Charges Total Minutes: 60   Total Minutes: 60    Therapy Charges for Today       Code Description Service Date Service Provider Modifiers Qty    [id number] HC ST MOTION FLUORO EVAL SWALLOW 4 9/7/2023 Lesly Mercedes MS CCC-SLP GN 1                     Lesly Mercedes MS CCC-SLP  9/7/2023

## 2024-01-05 RX ORDER — PANTOPRAZOLE SODIUM 20 MG/1
20 TABLET, DELAYED RELEASE ORAL DAILY
Qty: 90 TABLET | Refills: 1 | Status: SHIPPED | OUTPATIENT
Start: 2024-01-05

## 2024-01-05 RX ORDER — FAMOTIDINE 40 MG/1
40 TABLET, FILM COATED ORAL EVERY EVENING
Qty: 90 TABLET | Refills: 3 | Status: SHIPPED | OUTPATIENT
Start: 2024-01-05

## 2024-02-21 ENCOUNTER — HOSPITAL ENCOUNTER (OUTPATIENT)
Dept: GENERAL RADIOLOGY | Facility: HOSPITAL | Age: 74
Discharge: HOME | End: 2024-02-21
Payer: MEDICARE

## 2024-02-21 DIAGNOSIS — M25.551 PAIN IN JOINT INVOLVING PELVIC REGION AND THIGH, BILATERAL: ICD-10-CM

## 2024-02-21 DIAGNOSIS — M25.562 LEFT KNEE PAIN, UNSPECIFIED CHRONICITY: ICD-10-CM

## 2024-02-21 DIAGNOSIS — M25.552 PAIN IN JOINT INVOLVING PELVIC REGION AND THIGH, BILATERAL: ICD-10-CM

## 2024-02-21 DIAGNOSIS — M25.552 PAIN IN LEFT HIP: ICD-10-CM

## 2024-02-21 DIAGNOSIS — M54.41 RIGHT-SIDED LOW BACK PAIN WITH RIGHT-SIDED SCIATICA, UNSPECIFIED CHRONICITY: ICD-10-CM

## 2024-02-21 PROCEDURE — 73562 X-RAY EXAM OF KNEE 3: CPT

## 2024-02-21 PROCEDURE — 72190 X-RAY EXAM OF PELVIS: CPT

## 2024-06-18 ENCOUNTER — TELEPHONE (OUTPATIENT)
Dept: GASTROENTEROLOGY | Facility: CLINIC | Age: 74
End: 2024-06-18
Payer: MEDICARE

## 2024-06-18 NOTE — TELEPHONE ENCOUNTER
Called patient and advised of no sooner appts and advised to go to the Emergency room if the symptoms get unbearable.

## 2024-06-18 NOTE — TELEPHONE ENCOUNTER
Caller: Reina Zaman    Relationship to patient: Self    Best call back number: [phone]     Chief complaint: RECTAL BLEEDING     Type of visit: F/U     Requested date: SOONER APPT     Additional notes: PATIENT IS SCHEDULED FOR A F/U WITH MARY SEGAL ON 7/1 AND NEEDS A SOONER APPT DUE TO SYMPTOMS. PATIENT ALSO HAS A FAMILY HISTORY OF COLON CANCER. PLEASE CALL PATIENT.

## 2024-07-01 ENCOUNTER — OFFICE VISIT (OUTPATIENT)
Dept: GASTROENTEROLOGY | Facility: CLINIC | Age: 74
End: 2024-07-01
Payer: MEDICARE

## 2024-07-01 ENCOUNTER — TELEPHONE (OUTPATIENT)
Dept: GASTROENTEROLOGY | Facility: CLINIC | Age: 74
End: 2024-07-01
Payer: MEDICARE

## 2024-07-01 VITALS
TEMPERATURE: 97.7 F | BODY MASS INDEX: 24.27 KG/M2 | HEIGHT: 62 IN | SYSTOLIC BLOOD PRESSURE: 113 MMHG | HEART RATE: 62 BPM | WEIGHT: 131.9 LBS | DIASTOLIC BLOOD PRESSURE: 76 MMHG

## 2024-07-01 DIAGNOSIS — K62.5 RECTAL BLEEDING: Primary | ICD-10-CM

## 2024-07-01 DIAGNOSIS — K21.9 GASTROESOPHAGEAL REFLUX DISEASE, UNSPECIFIED WHETHER ESOPHAGITIS PRESENT: ICD-10-CM

## 2024-07-01 DIAGNOSIS — R09.A2 GLOBUS SENSATION: ICD-10-CM

## 2024-07-01 PROCEDURE — 1159F MED LIST DOCD IN RCRD: CPT

## 2024-07-01 PROCEDURE — 1160F RVW MEDS BY RX/DR IN RCRD: CPT

## 2024-07-01 PROCEDURE — 99214 OFFICE O/P EST MOD 30 MIN: CPT

## 2024-07-01 NOTE — TELEPHONE ENCOUNTER
EMAILED TO [place] JUAREZ [facility]   DATE 09/03/2024  TIME OF ARRIVAL IS 11 AM

## 2024-07-01 NOTE — PROGRESS NOTES
Chief Complaint  Rectal Bleeding and Heartburn    Subjective          History of Present Illness    Reina Zaman is a  74 y.o. female presents for follow-up for GERD.  She is a patient of Dr. Laura and is new to me.  She was last seen in the office by Ivis Ramirez PA-C on 8/14/2023.    At last office visit patient reported that she had weaned off of her pantoprazole.  She was taking Pepcid intermittently as needed.  She had reported upset stomach, dry cough and occasional episodes of choking.  She noticed these after being diagnosed with COVID a month before.  She was restarted on pantoprazole and a video swallow was completed which was unremarkable.     Today patient states that over the last several months she has noticed rectal bleeding which has been increasing in frequency.  She said she has had occasional episodes here and there over the years but reports that she keeps having episodes every few weeks.  She reports the first episode was a few months ago when she noticed bright red blood on the paper.  She did not look into the toilet bowl to see if there were any clots or liquid blood.  Since then she has had multiple episodes most recently she had 1 on Saturday and then 1 to 2 weeks prior to that.  She denies feeling constipated or straining.  She denies any anal discomfort.  She reports she has had issues with hemorrhoids in the past but denies external hemorrhoids at this time.  She denies associated dizziness, fatigue, shortness of breath.  She does report decreased appetite recently.  No unintentional weight loss.  She does have a history of GERD and Schatzki's ring.  She reports that she did stop her pantoprazole and Pepcid recently and after stopping the medication she developed globus sensation.  She has since started her antacid regimen back and feels like the globus sensation is improving.    From 8/12/2022 note:  7/13/2021 colonoscopy showed 2 small polyps-hyperplastic, nonbleeding internal  "hemorrhoids, otherwise normal.  Recall recommended in 5 years.     EGD at the same time showed small hiatal hernia, low-grade Schatzki's ring-balloon dilated to 18 mm, and moderate gastritis.  Gastric biopsies negative for H. pylori.     Family history of CRC and personal history of colon polyps.    Objective   Vital Signs:   /76   Pulse 62   Temp 97.7 °F (36.5 °C) (Temporal)   Ht 158.5 cm (62.4\")   Wt 59.8 kg (131 lb 14.4 oz)   BMI 23.82 kg/m²       Physical Exam  Constitutional:       General: She is not in acute distress.     Appearance: Normal appearance.   Eyes:      General: No scleral icterus.  Cardiovascular:      Rate and Rhythm: Normal rate.   Pulmonary:      Effort: Pulmonary effort is normal.   Abdominal:      General: Abdomen is flat. Bowel sounds are normal. There is no distension.      Tenderness: There is no abdominal tenderness. There is no guarding.   Genitourinary:     Comments: Patient declined rectal exam  Skin:     Coloration: Skin is not jaundiced.   Neurological:      General: No focal deficit present.      Mental Status: She is alert and oriented to person, place, and time.   Psychiatric:         Mood and Affect: Mood normal.         Behavior: Behavior normal.          Result Review :   The following data was reviewed by: Nicole Ramírez PA-C on 07/01/2024:              Assessment:   Diagnoses and all orders for this visit:    1. Rectal bleeding (Primary)  -     Case Request; Standing  -     Case Request    2. Gastroesophageal reflux disease, unspecified whether esophagitis present    3. Globus sensation    Other orders  -     Implement Anesthesia orders day of procedure.; Standing  -     Follow Anesthesia Guidelines / Protocol; Future  -     Verify bowel prep was successful; Standing  -     Give tap water enema if bowel prep was insufficient; Standing          Plan:   -Would like to check CBC-patient reports that she recently had blood drawn with her primary doctor, Dr. Maddox.  " Will request lab records  -Is been 3 years since her last colonoscopy-will plan for colonoscopy at this point given continued rectal bleeding.  Did advise she start on fiber supplement-she denies constipation but does sound like she does not feel like her bowels are totally emptying all the time and this may help  -Regarding globus sensation.  Recommend she continue on pantoprazole and Pepcid.  Discussed that given her history of Schatzki ring and reflux she may need to stay on these medications indefinitely.  Her symptoms seem to be improving since starting PPI once more.  Patient will continue to monitor and will send message if this worsens.      Follow Up   No follow-ups on file.    Dragon dictation used throughout this note.         Nicole Ramírez PA-C  [facility]  [address] [place] [address]  Office: [phone]

## 2024-08-26 ENCOUNTER — TELEPHONE (OUTPATIENT)
Dept: GASTROENTEROLOGY | Facility: CLINIC | Age: 74
End: 2024-08-26
Payer: MEDICARE

## 2024-08-26 NOTE — TELEPHONE ENCOUNTER
Hub staff attempted to follow warm transfer process and was unsuccessful     Caller: Reina Zaman    Relationship to patient: Self    Best call back number: [phone]    Patient is needing: PT CALLED TO RESCHEDULE 9/3/2024 PROCEDURE// PLEASE CALL PT BACK

## 2024-10-15 ENCOUNTER — OFFICE VISIT (OUTPATIENT)
Dept: GASTROENTEROLOGY | Facility: CLINIC | Age: 74
End: 2024-10-15
Payer: MEDICARE

## 2024-10-15 VITALS
HEART RATE: 73 BPM | DIASTOLIC BLOOD PRESSURE: 85 MMHG | BODY MASS INDEX: 24.4 KG/M2 | SYSTOLIC BLOOD PRESSURE: 125 MMHG | WEIGHT: 132.6 LBS | HEIGHT: 62 IN | TEMPERATURE: 97.5 F

## 2024-10-15 DIAGNOSIS — R09.A2 GLOBUS SENSATION: ICD-10-CM

## 2024-10-15 DIAGNOSIS — R12 HEARTBURN: Primary | ICD-10-CM

## 2024-10-15 PROCEDURE — 99214 OFFICE O/P EST MOD 30 MIN: CPT | Performed by: INTERNAL MEDICINE

## 2024-10-15 PROCEDURE — 1159F MED LIST DOCD IN RCRD: CPT | Performed by: INTERNAL MEDICINE

## 2024-10-15 PROCEDURE — 1160F RVW MEDS BY RX/DR IN RCRD: CPT | Performed by: INTERNAL MEDICINE

## 2024-10-15 NOTE — PROGRESS NOTES
Subjective   Chief Complaint   Patient presents with    Difficulty Swallowing    Heartburn       Reina Zaman is a  74 y.o. female here for a follow up visit for dysphagia and heartburn.     She reports continued issues with a sensation of globus.  She feels like she has a bubble in her throat.  Is worse after she eats.  She does feel like the pantoprazole helped with heartburn symptoms when she resumed it.  She is concerned about taking this long-term related to side effects.  She does not necessarily feel like food sticks in her esophagus.    EGD 7/13/2021 Schatzki's ring, dilated; c/s-polyps - 5 year recall    She has an upcoming colonoscopy.  She has only had 1 episode of bleeding since her last office visit.  This was in the setting of constipation.  HPI  Past Medical History:   Diagnosis Date    Arthritis     osteo    Asthma     Atrial fibrillation     Colon polyp     Coronary artery disease 2005    Endometriosis     GERD (gastroesophageal reflux disease)     Hyperlipidemia     MVP (mitral valve prolapse)     Osteoporosis     Squamous cell carcinoma     skin squamous     Past Surgical History:   Procedure Laterality Date    ABDOMINAL SURGERY      APPENDECTOMY      removed with colon surgery    COLON SURGERY  1983    bowel blockage    COLONOSCOPY  01/2015    COLONOSCOPY N/A 03/20/2018    Procedure: COLONOSCOPY INTO CECUM AND TI WITH HOT SNARE POLYPECTOMIES, SALINE LIFT;  Surgeon: Lidia Laura MD;  Location: [facility] ENDOSCOPY;  Service: Gastroenterology    COLONOSCOPY N/A 07/13/2021    Procedure: COLONOSCOPY TO CECUM WITH COLD SNARE POLYPECTOMIES;  Surgeon: Lidia Laura MD;  Location: [facility] ENDOSCOPY;  Service: Gastroenterology;  Laterality: N/A;  PRE OP - PERS H/O POLYPS  POST OP - COLON POLYPS, HEMORRHOIDS    ENDOSCOPY N/A 07/13/2021    Procedure: ESOPHAGOGASTRODUODENOSCOPY WITH BIOPSIES AND BALLOON DILATION 15, 16.5, 18;  Surgeon: Lidia Laura MD;  Location: [facility] ENDOSCOPY;  Service:  Gastroenterology;  Laterality: N/A;  PRE OP - GERD, DYSPHAGIA  POST OP- GASTRITIS, SM HIATAL HERNIA, SCHOTSKIES RING    FULGURATION ENDOMETRIOSIS      HYSTERECTOMY      SKIN CANCER EXCISION      TOTAL KNEE ARTHROPLASTY Right 10/18/2021    Procedure: TOTAL KNEE ARTHROPLASTY;  Surgeon: Jason Gerard MD;  Location: [facility] OR;  Service: Orthopedics;  Laterality: Right;    UPPER GASTROINTESTINAL ENDOSCOPY         Current Outpatient Medications:     Acetaminophen (TYLENOL ARTHRITIS EXT RELIEF PO), Take  by mouth., Disp: , Rfl:     ascorbic acid (VITAMIN C) 500 MG tablet, Take 1 tablet by mouth Daily., Disp: , Rfl:     aspirin 81 MG EC tablet, Take 1 tablet by mouth twice daily for 14 days; then take 1 tablet by mouth daily for 28 days., Disp: 60 tablet, Rfl: 0    B Complex Vitamins (VITAMIN B COMPLEX PO), Take  by mouth., Disp: , Rfl:     Calcium Carb-Cholecalciferol (CALCIUM CARBONATE+VITAMIN D PO), Take 1 tablet by mouth., Disp: , Rfl:     coenzyme Q10 50 MG capsule capsule, Take  by mouth Daily., Disp: , Rfl:     fexofenadine (ALLEGRA) 180 MG tablet, Take 1 tablet by mouth Daily., Disp: , Rfl:     fluticasone (FLONASE) 50 MCG/ACT nasal spray, Administer 2 sprays into the nostril(s) as directed by provider Daily., Disp: , Rfl:     metoprolol succinate XL (TOPROL-XL) 25 MG 24 hr tablet, Take 0.5 tablets by mouth Every Evening., Disp: , Rfl:     Omega-3 Fatty Acids (fish oil) 1000 MG capsule capsule, Take  by mouth., Disp: , Rfl:     pantoprazole (PROTONIX) 20 MG EC tablet, TAKE 1 TABLET BY MOUTH EVERY DAY, Disp: 90 tablet, Rfl: 1    Probiotic Product (PROBIOTIC PO), Take  by mouth., Disp: , Rfl:     Turmeric (QC TUMERIC COMPLEX PO), Take  by mouth., Disp: , Rfl:   No current facility-administered medications for this visit.    Facility-Administered Medications Ordered in Other Visits:     Chlorhexidine Gluconate Cloth 2 % pads, , Apply externally, BID, Moore, Salas, APRN  PRN Meds:.  No Known Allergies  Social  History     Socioeconomic History    Marital status:    Tobacco Use    Smoking status: Former     Current packs/day: 0.00     Average packs/day: 0.3 packs/day for 4.9 years (1.2 ttl pk-yrs)     Types: Cigarettes     Start date: 1970     Quit date: 1975     Years since quittin.8    Smokeless tobacco: Never    Tobacco comments:     Smoked during college and a few years after   Vaping Use    Vaping status: Never Used   Substance and Sexual Activity    Alcohol use: Yes     Alcohol/week: 7.0 standard drinks of alcohol     Types: 7 Glasses of wine per week     Comment: Red wine with dinner    Drug use: Never     Comment: history college    Sexual activity: Yes     Partners: Male     Birth control/protection: Post-menopausal     Family History   Problem Relation Age of Onset    Cancer Maternal Grandmother         colon    Colon cancer Maternal Grandmother     Colon polyps Maternal Grandmother     Cancer Other         two cousins colon cancer    Heart disease Mother     Cancer Mother         brain    Alcohol abuse Father     Colon polyps Sister     [place] Hyperthermia Neg Hx      Review of Systems   Constitutional:  Negative for appetite change and unexpected weight change.   HENT:  Negative for trouble swallowing.    Gastrointestinal:  Negative for abdominal pain and nausea.     Vitals:    10/15/24 1517   BP: 125/85   Pulse: 73   Temp: 97.5 °F (36.4 °C)         10/15/24  1517   Weight: 60.1 kg (132 lb 9.6 oz)       Objective   Physical Exam  Constitutional:       Appearance: Normal appearance. She is well-developed.   HENT:      Head: Normocephalic and atraumatic.   Eyes:      General: No scleral icterus.     Conjunctiva/sclera: Conjunctivae normal.   Pulmonary:      Effort: Pulmonary effort is normal.   Abdominal:      General: There is no distension.      Palpations: Abdomen is soft.   Musculoskeletal:      Cervical back: Normal range of motion and neck supple.   Skin:     General: Skin is warm and  "dry.   Neurological:      Mental Status: She is alert.   Psychiatric:         Mood and Affect: Mood normal.         Behavior: Behavior normal.       Lab Results   Component Value Date    WBC 5.76 10/07/2021    HGB 13.0 10/07/2021    HCT 40.3 10/07/2021    MCV 95.5 10/07/2021     10/07/2021     Lab Results   Component Value Date    GLUCOSE 80 10/07/2021    BUN 15 10/07/2021    CREATININE 0.70 10/07/2021    BCR 21.4 10/07/2021    K 4.3 10/07/2021    CO2 27.5 10/07/2021    CALCIUM 9.2 10/07/2021     No results found for: \"NQTL322\"   No results found for: \"CALPROFECAL\"     No radiology results for the last 90 days.     Assessment & Plan   Diagnoses and all orders for this visit:    Heartburn    Globus sensation      Plan:  Will plan to add an EGD to her upcoming colonoscopy to further evaluate her heartburn and globus.  If this is negative, may need VFSS  Recommend increasing pantoprazole to 40 mg for the next 2 to 3 weeks until her evaluation to see if this helps at all with her symptoms.  Continue GERD diet lifestyle modification to reduce symptoms     "

## 2024-10-16 ENCOUNTER — PREP FOR SURGERY (OUTPATIENT)
Dept: OTHER | Facility: HOSPITAL | Age: 74
End: 2024-10-16
Payer: MEDICARE

## 2024-10-16 DIAGNOSIS — R12 HEARTBURN: Primary | ICD-10-CM

## 2024-10-16 DIAGNOSIS — K62.5 RECTAL BLEEDING: ICD-10-CM

## 2024-10-16 DIAGNOSIS — R09.A2 GLOBUS SENSATION: ICD-10-CM

## 2024-10-22 ENCOUNTER — TELEPHONE (OUTPATIENT)
Dept: GASTROENTEROLOGY | Facility: CLINIC | Age: 74
End: 2024-10-22
Payer: MEDICARE

## 2024-10-22 NOTE — TELEPHONE ENCOUNTER
Caller: Reina Zaman    Relationship to patient: Self    Best call back number: [phone]    Patient is needing: PT HAS A COLONOSCOPY NEXT WEEK AND HAS SOME QUESTIONS REGARDING HER MEDICATIONS WHICH AREN'T ON THE LIST. PLEASE CALL PT TO ADVISE

## 2024-10-22 NOTE — TELEPHONE ENCOUNTER
Caller: Reina Zaman    Relationship: Self    Best call back number: [phone]    What is the best time to reach you: ANYTIME    Who are you requesting to speak with (clinical staff, provider,  specific staff member):     What was the call regarding: QUESTIONS UPCOMING SCOPE    Is it okay if the provider responds through MyChart: NO

## 2024-10-30 ENCOUNTER — HOSPITAL ENCOUNTER (OUTPATIENT)
Facility: SURGERY CENTER | Age: 74
Setting detail: HOSPITAL OUTPATIENT SURGERY
Discharge: HOME | End: 2024-10-30
Attending: INTERNAL MEDICINE | Admitting: INTERNAL MEDICINE
Payer: MEDICARE

## 2024-10-30 ENCOUNTER — ANESTHESIA (OUTPATIENT)
Dept: SURGERY | Facility: SURGERY CENTER | Age: 74
End: 2024-10-30
Payer: MEDICARE

## 2024-10-30 ENCOUNTER — ANESTHESIA EVENT (OUTPATIENT)
Dept: SURGERY | Facility: SURGERY CENTER | Age: 74
End: 2024-10-30
Payer: MEDICARE

## 2024-10-30 VITALS
RESPIRATION RATE: 16 BRPM | BODY MASS INDEX: 23.19 KG/M2 | HEART RATE: 59 BPM | WEIGHT: 126.8 LBS | SYSTOLIC BLOOD PRESSURE: 140 MMHG | OXYGEN SATURATION: 96 % | DIASTOLIC BLOOD PRESSURE: 77 MMHG | TEMPERATURE: 97.1 F

## 2024-10-30 DIAGNOSIS — R12 HEARTBURN: ICD-10-CM

## 2024-10-30 DIAGNOSIS — R09.A2 GLOBUS SENSATION: ICD-10-CM

## 2024-10-30 DIAGNOSIS — K62.5 RECTAL BLEEDING: ICD-10-CM

## 2024-10-30 PROCEDURE — 88305 TISSUE EXAM BY PATHOLOGIST: CPT | Performed by: INTERNAL MEDICINE

## 2024-10-30 PROCEDURE — 45380 COLONOSCOPY AND BIOPSY: CPT | Performed by: INTERNAL MEDICINE

## 2024-10-30 PROCEDURE — S0260 H&P FOR SURGERY: HCPCS | Performed by: INTERNAL MEDICINE

## 2024-10-30 PROCEDURE — 87206 SMEAR FLUORESCENT/ACID STAI: CPT | Performed by: INTERNAL MEDICINE

## 2024-10-30 PROCEDURE — 25010000002 LIDOCAINE 2% SOLUTION: Performed by: NURSE ANESTHETIST, CERTIFIED REGISTERED

## 2024-10-30 PROCEDURE — 25810000003 LACTATED RINGERS PER 1000 ML: Performed by: INTERNAL MEDICINE

## 2024-10-30 PROCEDURE — 25010000002 PROPOFOL 10 MG/ML EMULSION: Performed by: NURSE ANESTHETIST, CERTIFIED REGISTERED

## 2024-10-30 PROCEDURE — 43239 EGD BIOPSY SINGLE/MULTIPLE: CPT | Performed by: INTERNAL MEDICINE

## 2024-10-30 RX ORDER — ONDANSETRON 2 MG/ML
4 INJECTION INTRAMUSCULAR; INTRAVENOUS ONCE AS NEEDED
Status: DISCONTINUED | OUTPATIENT
Start: 2024-10-30 | End: 2024-10-30 | Stop reason: HOSPADM

## 2024-10-30 RX ORDER — PROPOFOL 10 MG/ML
VIAL (ML) INTRAVENOUS AS NEEDED
Status: DISCONTINUED | OUTPATIENT
Start: 2024-10-30 | End: 2024-10-30 | Stop reason: SURG

## 2024-10-30 RX ORDER — PROMETHAZINE HYDROCHLORIDE 12.5 MG/1
25 TABLET ORAL ONCE AS NEEDED
Status: DISCONTINUED | OUTPATIENT
Start: 2024-10-30 | End: 2024-10-30 | Stop reason: HOSPADM

## 2024-10-30 RX ORDER — PROMETHAZINE HYDROCHLORIDE 25 MG/1
25 SUPPOSITORY RECTAL ONCE AS NEEDED
Status: DISCONTINUED | OUTPATIENT
Start: 2024-10-30 | End: 2024-10-30 | Stop reason: HOSPADM

## 2024-10-30 RX ORDER — SODIUM CHLORIDE, SODIUM LACTATE, POTASSIUM CHLORIDE, CALCIUM CHLORIDE 600; 310; 30; 20 MG/100ML; MG/100ML; MG/100ML; MG/100ML
30 INJECTION, SOLUTION INTRAVENOUS CONTINUOUS
Status: DISCONTINUED | OUTPATIENT
Start: 2024-10-30 | End: 2024-10-30 | Stop reason: HOSPADM

## 2024-10-30 RX ORDER — LIDOCAINE HYDROCHLORIDE 10 MG/ML
0.5 INJECTION, SOLUTION INFILTRATION; PERINEURAL ONCE AS NEEDED
Status: DISCONTINUED | OUTPATIENT
Start: 2024-10-30 | End: 2024-10-30 | Stop reason: HOSPADM

## 2024-10-30 RX ORDER — LIDOCAINE HYDROCHLORIDE 20 MG/ML
INJECTION, SOLUTION INFILTRATION; PERINEURAL AS NEEDED
Status: DISCONTINUED | OUTPATIENT
Start: 2024-10-30 | End: 2024-10-30 | Stop reason: SURG

## 2024-10-30 RX ORDER — HYDROCORTISONE 25 MG/G
CREAM TOPICAL 2 TIMES DAILY
Qty: 28 G | Refills: 3 | Status: SHIPPED | OUTPATIENT
Start: 2024-10-30

## 2024-10-30 RX ORDER — SODIUM CHLORIDE 0.9 % (FLUSH) 0.9 %
10 SYRINGE (ML) INJECTION AS NEEDED
Status: DISCONTINUED | OUTPATIENT
Start: 2024-10-30 | End: 2024-10-30 | Stop reason: HOSPADM

## 2024-10-30 RX ADMIN — PROPOFOL 100 MG: 10 INJECTION, EMULSION INTRAVENOUS at 11:02

## 2024-10-30 RX ADMIN — LIDOCAINE HYDROCHLORIDE 100 MG: 20 INJECTION, SOLUTION INFILTRATION; PERINEURAL at 11:02

## 2024-10-30 RX ADMIN — PROPOFOL 200 MCG/KG/MIN: 10 INJECTION, EMULSION INTRAVENOUS at 11:02

## 2024-10-30 RX ADMIN — SODIUM CHLORIDE, SODIUM LACTATE, POTASSIUM CHLORIDE, AND CALCIUM CHLORIDE 30 ML/HR: .6; .31; .03; .02 INJECTION, SOLUTION INTRAVENOUS at 10:28

## 2024-10-30 NOTE — H&P
[facility]  Pre Procedure History & Physical    Chief Complaint:   Dyspepsia, globus, nausea, rectal bleeding    Subjective     HPI:   73 yo here today for egd/colonoscopy.  Pt reports + FH CRC/polyps.  Patient with symptoms of globus - was nauseated when on bid ppi (did not help globus but belched less).  She reports some rectal bleeding intermittently.  Last exam 2021    Past Medical History:   Past Medical History:   Diagnosis Date    Arthritis     osteo    Asthma     Atrial fibrillation     Colon polyp     Coronary artery disease 2005    Endometriosis     GERD (gastroesophageal reflux disease)     Hyperlipidemia     MVP (mitral valve prolapse)     Osteoporosis     Squamous cell carcinoma     skin squamous       Past Surgical History:  Past Surgical History:   Procedure Laterality Date    ABDOMINAL SURGERY      APPENDECTOMY      removed with colon surgery    COLON SURGERY  1983    bowel blockage    COLONOSCOPY  01/2015    COLONOSCOPY N/A 03/20/2018    Procedure: COLONOSCOPY INTO CECUM AND TI WITH HOT SNARE POLYPECTOMIES, SALINE LIFT;  Surgeon: Lidia Laura MD;  Location: [facility] ENDOSCOPY;  Service: Gastroenterology    COLONOSCOPY N/A 07/13/2021    Procedure: COLONOSCOPY TO CECUM WITH COLD SNARE POLYPECTOMIES;  Surgeon: Lidia Laura MD;  Location: [facility] ENDOSCOPY;  Service: Gastroenterology;  Laterality: N/A;  PRE OP - PERS H/O POLYPS  POST OP - COLON POLYPS, HEMORRHOIDS    ENDOSCOPY N/A 07/13/2021    Procedure: ESOPHAGOGASTRODUODENOSCOPY WITH BIOPSIES AND BALLOON DILATION 15, 16.5, 18;  Surgeon: Liida Laura MD;  Location: [facility] ENDOSCOPY;  Service: Gastroenterology;  Laterality: N/A;  PRE OP - GERD, DYSPHAGIA  POST OP- GASTRITIS, SM HIATAL HERNIA, SCHOTSKIES RING    FULGURATION ENDOMETRIOSIS      HYSTERECTOMY      SKIN CANCER EXCISION      TOTAL KNEE ARTHROPLASTY Right 10/18/2021    Procedure: TOTAL KNEE ARTHROPLASTY;  Surgeon: Jason Gerard MD;  Location: [facility]  OR;  Service: Orthopedics;  Laterality: Right;    UPPER GASTROINTESTINAL ENDOSCOPY         Family History:  Family History   Problem Relation Age of Onset    Cancer Maternal Grandmother         colon    Colon cancer Maternal Grandmother     Colon polyps Maternal Grandmother     Cancer Other         two cousins colon cancer    Heart disease Mother     Cancer Mother         brain    Alcohol abuse Father     Colon polyps Sister     [place] Hyperthermia Neg Hx        Social History:   reports that she quit smoking about 49 years ago. Her smoking use included cigarettes. She started smoking about 54 years ago. She has a 1.2 pack-year smoking history. She has never used smokeless tobacco. She reports current alcohol use of about 7.0 standard drinks of alcohol per week. She reports that she does not use drugs.    Medications:   Medications Prior to Admission   Medication Sig Dispense Refill Last Dose/Taking    Acetaminophen (TYLENOL ARTHRITIS EXT RELIEF PO) Take  by mouth.   10/29/2024    aspirin 81 MG EC tablet Take 1 tablet by mouth twice daily for 14 days; then take 1 tablet by mouth daily for 28 days. 60 tablet 0 10/29/2024 Morning    fluticasone (FLONASE) 50 MCG/ACT nasal spray Administer 2 sprays into the nostril(s) as directed by provider Daily.   Past Week    metoprolol succinate XL (TOPROL-XL) 25 MG 24 hr tablet Take 0.5 tablets by mouth Every Evening.   10/29/2024    pantoprazole (PROTONIX) 20 MG EC tablet TAKE 1 TABLET BY MOUTH EVERY DAY 90 tablet 1 10/29/2024    ascorbic acid (VITAMIN C) 500 MG tablet Take 1 tablet by mouth Daily.   10/25/2024    B Complex Vitamins (VITAMIN B COMPLEX PO) Take  by mouth.   10/25/2024    Calcium Carb-Cholecalciferol (CALCIUM CARBONATE+VITAMIN D PO) Take 1 tablet by mouth.   10/25/2024    coenzyme Q10 50 MG capsule capsule Take  by mouth Daily.   10/25/2024    fexofenadine (ALLEGRA) 180 MG tablet Take 1 tablet by mouth Daily.   10/28/2024    Omega-3 Fatty Acids (fish oil) 1000 MG  capsule capsule Take  by mouth.   10/25/2024    Probiotic Product (PROBIOTIC PO) Take  by mouth.   10/27/2024    Turmeric (QC TUMERIC COMPLEX PO) Take  by mouth.   10/25/2024       Allergies:  Patient has no known allergies.    ROS:    Pertinent items are noted in HPI     Objective     Blood pressure 131/76, pulse 62, temperature 98.4 °F (36.9 °C), temperature source Temporal, resp. rate 16, weight 57.5 kg (126 lb 12.8 oz), SpO2 99%.    Physical Exam   Constitutional: Pt is oriented to person, place, and time and well-developed, well-nourished, and in no distress.   Mouth/Throat: Oropharynx is clear and moist.   Neck: Normal range of motion.   Cardiovascular: Normal rate, regular rhythm    Pulmonary/Chest: Effort normal    Abdominal: Soft. Nontender  Skin: Skin is warm and dry.   Psychiatric: Mood, memory, affect and judgment normal.     Assessment & Plan     Diagnosis:  Dyspepsia, globus, nausea, rectal bleeding    Anticipated Surgical Procedure:  Egd/colonoscopy    The risks, benefits, and alternatives of this procedure have been discussed with the patient or the responsible party- the patient understands and agrees to proceed.

## 2024-10-30 NOTE — ANESTHESIA POSTPROCEDURE EVALUATION
Patient: Reina Zaman    Procedure Summary       Date: 10/30/24 Room / Location: [facility] / [facility]    Anesthesia Start: 1057 Anesthesia Stop: 1131    Procedures:       COLONOSCOPY TO CECUM WITH COLD BX POLYPECTOMIES      ESOPHAGOGASTRODUODENOSCOPY with BX AND ESOPHAGEAL BRUSHINGS (Esophagus) Diagnosis:       Heartburn      Globus sensation      Rectal bleeding      (Heartburn [R12])      (Globus sensation [R09.A2])      (Rectal bleeding [K62.5])    Surgeons: Lidia Laura MD Provider: Arlet Benoit MD    Anesthesia Type: MAC ASA Status: 2            Anesthesia Type: MAC    Vitals  Vitals Value Taken Time   /77 10/30/24 1200   Temp 36.2 °C (97.1 °F) 10/30/24 1130   Pulse 59 10/30/24 1200   Resp 16 10/30/24 1200   SpO2 96 % 10/30/24 1200           Post Anesthesia Care and Evaluation    Patient location during evaluation: PHASE II  Patient participation: complete - patient participated  Level of consciousness: awake  Pain management: adequate    Airway patency: patent  Anesthetic complications: No anesthetic complications    Cardiovascular status: acceptable  Respiratory status: acceptable  Hydration status: acceptable    Comments: /77 (BP Location: Left arm, Patient Position: Lying)   Pulse 59   Temp 36.2 °C (97.1 °F) (Infrared)   Resp 16   Wt 57.5 kg (126 lb 12.8 oz)   SpO2 96%   BMI 23.19 kg/m²

## 2024-10-30 NOTE — ANESTHESIA PREPROCEDURE EVALUATION
Anesthesia Evaluation     Patient summary reviewed                Airway   Mallampati: I  No difficulty expected  Dental - normal exam     Comment: Risk of dental injury discussed with patient    Pulmonary - normal exam   (+) asthma,  Cardiovascular - normal exam    ECG reviewed    (+) valvular problems/murmurs MVP, dysrhythmias Paroxysmal Atrial Fib, PVC, hyperlipidemia    ROS comment: Reviewed latest echo and cardiology note:    Physician Conclusions   Any valve disease noted in the report is non-rheumatic unless otherwise specifically noted.   Summary:                 The left ventricle chamber size, wall thickness, systolic and diastolic function are within normal limits.                            There are no regional wall motion abnormalities observed.                            The ejection fraction biplane was calculated at 67%.                            Mild myxomatous degeneration of mitral valve.                            Borderline prolapse of anterior leaflet of mitral valve.                            Trace mitral regurgitation is present.                            There is no evidence of pericardial effusion.                            When compared with the previous echocardiogram from 7/27/2015, there are no significant interval                            changes.       Neuro/Psych- negative ROS  GI/Hepatic/Renal/Endo    (+) GERD, GI bleeding   (-) no renal disease, diabetes, no thyroid disorder    Musculoskeletal     Abdominal    Substance History      OB/GYN          Other   arthritis,                   Anesthesia Plan    ASA 2     MAC       Anesthetic plan, risks, benefits, and alternatives have been provided, discussed and informed consent has been obtained with: patient.      CODE STATUS:

## 2024-10-31 LAB — GIE STN SPEC: ABNORMAL

## 2024-11-01 LAB
CYTO UR: NORMAL
LAB AP CASE REPORT: NORMAL
LAB AP CLINICAL INFORMATION: NORMAL
PATH REPORT.FINAL DX SPEC: NORMAL
PATH REPORT.GROSS SPEC: NORMAL

## 2024-11-10 RX ORDER — FLUCONAZOLE 100 MG/1
TABLET ORAL
Qty: 15 TABLET | Refills: 0 | Status: SHIPPED | OUTPATIENT
Start: 2024-11-10

## 2024-11-11 ENCOUNTER — TELEPHONE (OUTPATIENT)
Dept: GASTROENTEROLOGY | Facility: CLINIC | Age: 74
End: 2024-11-11
Payer: MEDICARE

## 2024-11-11 NOTE — TELEPHONE ENCOUNTER
Small bowel and stomach biopsies were normal     Esophageal brushings were positive for yeast - 14 d course of antifungal sent to pharmacy (fluconazole)     The polyp(s) biopsies showed adenomatous change. This is not cancerous but is considered potentially precancerous. Would defer further colon cancer screening due to age and low risk findings on recent examination     Office f/u in 4-6 weeks   Written by Lidia Laura MD on 11/10/2024  9:33 AM EST  Seen by patient Reina Zaman on 11/10/2024 10:46 AM        Message sent to pt thru [employer] advising to call us to make f/u appt.

## 2024-12-05 ENCOUNTER — OFFICE VISIT (OUTPATIENT)
Dept: GASTROENTEROLOGY | Facility: CLINIC | Age: 74
End: 2024-12-05
Payer: MEDICARE

## 2024-12-05 VITALS
BODY MASS INDEX: 24.4 KG/M2 | HEART RATE: 73 BPM | HEIGHT: 62 IN | SYSTOLIC BLOOD PRESSURE: 104 MMHG | WEIGHT: 132.6 LBS | DIASTOLIC BLOOD PRESSURE: 70 MMHG

## 2024-12-05 DIAGNOSIS — R12 HEARTBURN: Primary | ICD-10-CM

## 2024-12-05 DIAGNOSIS — R09.A2 GLOBUS SENSATION: ICD-10-CM

## 2024-12-05 RX ORDER — HYDROCORTISONE 25 MG/G
CREAM TOPICAL
COMMUNITY
Start: 2024-10-30

## 2024-12-05 NOTE — PROGRESS NOTES
"Chief Complaint  Rectal Bleeding and Heartburn    Subjective          History of Present Illness    Reina Zaman is a  74 y.o. female presents for follow-up for dysphagia and heartburn.  She is a patient of Dr. Laura.    She was last seen in the office 10/15/2024 and had reported continued issues with globus sensation.  She subsequently underwent EGD along with her colonoscopy the end of October as below.  She was found to have yeast in her esophagus and was treated with antifungal medication.  She reports that since completing this medication she been doing much better.  Occasional issues with globus sensation and reflux but this does not happen often.  She denies any abdominal pain.  Occasional nausea.  She states that after increasing her pantoprazole to twice daily after last appointment she felt like it worsened her nausea.  No black or bloody stool.  She reports he is having regular bowel movements.    EGD 10/30/2024 showed white nummular lesions in esophageal mucosa, gastritis, normal examined duodenum.  Esophageal brushings were positive for yeast-14-day course of antifungal sent to pharmacy.    Colonoscopy 10/30/2024 showed nonbleeding internal hemorrhoids, 3 polyps in sigmoid colon, nonbleeding internal hemorrhoids and otherwise normal exam.  Polyp showed adenomatous change.  Would defer further screening due to age.    EGD 7/13/2021 Schatzki's ring, dilated; c/s-polyps - 5 year recall     She has an upcoming colonoscopy.  She has only had 1 episode of bleeding since her last office visit.  This was in the setting of constipation.    Objective   Vital Signs:   /70 (BP Location: Left arm, Patient Position: Sitting, Cuff Size: Adult)   Pulse 73   Ht 157.5 cm (62\")   Wt 60.1 kg (132 lb 9.6 oz)   BMI 24.25 kg/m²       Physical Exam  Constitutional:       General: She is not in acute distress.     Appearance: Normal appearance.   Eyes:      General: No scleral icterus.  Cardiovascular:      Rate " and Rhythm: Normal rate.   Pulmonary:      Effort: Pulmonary effort is normal.   Abdominal:      General: Abdomen is flat. There is no distension.      Tenderness: There is no abdominal tenderness. There is no guarding.   Skin:     Coloration: Skin is not jaundiced.   Neurological:      General: No focal deficit present.      Mental Status: She is alert and oriented to person, place, and time.   Psychiatric:         Mood and Affect: Mood normal.         Behavior: Behavior normal.          Result Review :   The following data was reviewed by: Nicole Ramírez PA-C on 12/05/2024:              Assessment:   Diagnoses and all orders for this visit:    1. Heartburn (Primary)    2. Globus sensation          Plan:   -We had lengthy discussion about the findings of her EGD and colonoscopy.  With her history of Schatzki ring I did discuss that it is recommended she remain on acid suppression long-term.  She would like to avoid being on a PPI long-term so we did discuss taking Pepcid regularly to keep GERD controlled.  Advise diet and lifestyle modifications to reduce symptoms of reflux as well        Follow Up   Return in about 1 year (around 12/5/2025), or if symptoms worsen or fail to improve.    Dragon dictation used throughout this note.         Nicole Ramírez PA-C  [facility]  [address] [place] [address]  Office: [phone]

## 2025-03-03 ENCOUNTER — TRANSCRIBE ORDERS (OUTPATIENT)
Dept: ADMINISTRATIVE | Facility: HOSPITAL | Age: 75
End: 2025-03-03
Payer: MEDICARE

## 2025-03-03 DIAGNOSIS — Z12.31 VISIT FOR SCREENING MAMMOGRAM: Primary | ICD-10-CM

## 2025-03-27 RX ORDER — PANTOPRAZOLE SODIUM 20 MG/1
20 TABLET, DELAYED RELEASE ORAL DAILY
Qty: 90 TABLET | Refills: 1 | Status: SHIPPED | OUTPATIENT
Start: 2025-03-27

## (undated) DEVICE — GOWN ISOL W/THUMB UNIV BLU BX/15

## (undated) DEVICE — PENCL E/S ULTRAVAC TELESCP NOSE HOLSTR 10FT

## (undated) DEVICE — Device

## (undated) DEVICE — SINGLE-USE BIOPSY FORCEPS: Brand: RADIAL JAW 4

## (undated) DEVICE — GLV SURG SENSICARE PI MIC PF SZ7 LF STRL

## (undated) DEVICE — SNAR POLYP SENSATION STDOVL 27 240 BX40

## (undated) DEVICE — THE SINGLE USE ETRAP – POLYP TRAP IS USED FOR SUCTION RETRIEVAL OF ENDOSCOPICALLY REMOVED POLYPS.: Brand: ETRAP

## (undated) DEVICE — GLV SURG PREMIERPRO ORTHO LTX PF SZ7.5 BRN

## (undated) DEVICE — BALN DIL ESOPH CRE CONTRL RADL 15/18MM 8CM BX5

## (undated) DEVICE — DRSNG SURESITE WNDW 2.38X2.75

## (undated) DEVICE — VIAL FORMLN CAP 10PCT 20ML

## (undated) DEVICE — SUT VIC 0 CT1 36IN J946H

## (undated) DEVICE — SNAR POLYP CAPTIVATOR RND STFF 2.4 240CM 10MM 1P/U

## (undated) DEVICE — SUT VIC 1 CT1 36IN J947H

## (undated) DEVICE — SYS CLS SKIN PREMIERPRO EXOFINFUSION 22CM

## (undated) DEVICE — PREP SOL POVIDONE/IODINE BT 4OZ

## (undated) DEVICE — BITEBLOCK OMNI BLOC

## (undated) DEVICE — CANN NASL CO2 TRULINK W/O2 A/

## (undated) DEVICE — FLEX ADVANTAGE 1500CC: Brand: FLEX ADVANTAGE

## (undated) DEVICE — LN SMPL CO2 SHTRM SD STREAM W/M LUER

## (undated) DEVICE — SENSR O2 OXIMAX FNGR A/ 18IN NONSTR

## (undated) DEVICE — MEDI-VAC YANKAUER SUCTION HANDLE W/BULBOUS TIP: Brand: CARDINAL HEALTH

## (undated) DEVICE — THE TORRENT IRRIGATION SCOPE CONNECTOR IS USED WITH THE TORRENT IRRIGATION TUBING TO PROVIDE IRRIGATION FLUIDS SUCH AS STERILE WATER DURING GASTROINTESTINAL ENDOSCOPIC PROCEDURES WHEN USED IN CONJUNCTION WITH AN IRRIGATION PUMP (OR ELECTROSURGICAL UNIT).: Brand: TORRENT

## (undated) DEVICE — FRCP BX RADJAW4 NDL 2.8 240CM LG OG BX40

## (undated) DEVICE — UNDERCAST PADDING: Brand: DEROYAL

## (undated) DEVICE — TRAP FLD MINIVAC MEGADYNE 100ML

## (undated) DEVICE — KT ORCA ORCAPOD DISP STRL

## (undated) DEVICE — STERILE PATIENT PROTECTIVE PAD FOR IMP® KNEE POSITIONERS & COHESIVE WRAP (10 / CASE): Brand: DE MAYO KNEE POSITIONER®

## (undated) DEVICE — PAD GRND REM POLYHESIVE A/ DISP

## (undated) DEVICE — SOL NACL 0.9PCT 100ML SGL

## (undated) DEVICE — NEEDLE, QUINCKE 22GX3.5": Brand: MEDLINE INDUSTRIES, INC.

## (undated) DEVICE — ADAPT CLN BIOGUARD AIR/H2O DISP

## (undated) DEVICE — CANN O2 ETCO2 FITS ALL CONN CO2 SMPL A/ 7IN DISP LF

## (undated) DEVICE — APPL DURAPREP IODOPHOR APL 26ML

## (undated) DEVICE — ADAPT CLN SCPE ENDO PORPOISE BX/50 DISP

## (undated) DEVICE — STPLR SKIN VISISTAT WD 35CT

## (undated) DEVICE — BNDG ELAS ELITE V/CLOSE 6IN 5YD LF STRL

## (undated) DEVICE — BLCK/BITE BLOX W/DENTL/RIM W/STRAP 54F

## (undated) DEVICE — Device: Brand: DEFENDO AIR/WATER/SUCTION AND BIOPSY VALVE

## (undated) DEVICE — THE CARR-LOCKE INJECTION NEEDLE IS A SINGLE USE, DISPOSABLE, FLEXIBLE SHEATH INJECTION NEEDLE USED FOR THE INJECTION OF VARIOUS TYPES OF MEDIA THROUGH FLEXIBLE ENDOSCOPES.

## (undated) DEVICE — GAUZE,SPONGE,FLUFF,6"X6.75",STRL,5/TRAY: Brand: MEDLINE

## (undated) DEVICE — DUAL CUT SAGITTAL BLADE

## (undated) DEVICE — GLV SURG BIOGEL LTX PF 7 1/2

## (undated) DEVICE — BRUSH CYTO MULTI APPL

## (undated) DEVICE — GLV SURG SENSICARE PI PF LF 7 GRN STRL

## (undated) DEVICE — 3M™ IOBAN™ 2 ANTIMICROBIAL INCISE DRAPE 6640EZ: Brand: IOBAN™ 2

## (undated) DEVICE — MAT FLR ABSORBENT LG 4FT 10 2.5FT

## (undated) DEVICE — MSK ENDO PORT O2 POM ELITE CURAPLEX A/

## (undated) DEVICE — DEV INFL ALLIANCE2 SYS

## (undated) DEVICE — KT DRN EVAC WND PVC PCH WTROC RND 10F400

## (undated) DEVICE — PK KN TOTL 40

## (undated) DEVICE — TUBING, SUCTION, 1/4" X 10', STRAIGHT: Brand: MEDLINE

## (undated) DEVICE — GLV SURG SENSICARE W/ALOE PF LF 8 STRL

## (undated) DEVICE — PREMIUM WET SKIN PREP TRAY: Brand: MEDLINE INDUSTRIES, INC.